# Patient Record
Sex: FEMALE | Race: WHITE | NOT HISPANIC OR LATINO | Employment: FULL TIME | URBAN - NONMETROPOLITAN AREA
[De-identification: names, ages, dates, MRNs, and addresses within clinical notes are randomized per-mention and may not be internally consistent; named-entity substitution may affect disease eponyms.]

---

## 2017-11-08 ENCOUNTER — HOSPITAL ENCOUNTER (EMERGENCY)
Facility: HOSPITAL | Age: 37
Discharge: HOME | End: 2017-11-08
Payer: COMMERCIAL

## 2017-11-08 VITALS
RESPIRATION RATE: 18 BRPM | TEMPERATURE: 97.5 F | WEIGHT: 183.13 LBS | SYSTOLIC BLOOD PRESSURE: 118 MMHG | HEART RATE: 74 BPM | OXYGEN SATURATION: 98 % | BODY MASS INDEX: 27.76 KG/M2 | HEIGHT: 68 IN | DIASTOLIC BLOOD PRESSURE: 58 MMHG

## 2017-11-08 DIAGNOSIS — M54.10 RADICULAR PAIN OF RIGHT LOWER EXTREMITY: ICD-10-CM

## 2017-11-08 DIAGNOSIS — M25.551 RIGHT HIP PAIN: Primary | ICD-10-CM

## 2017-11-08 PROCEDURE — 99283 EMERGENCY DEPT VISIT LOW MDM: CPT

## 2017-11-08 PROCEDURE — 96372 THER/PROPH/DIAG INJ SC/IM: CPT

## 2017-11-08 RX ORDER — METHOCARBAMOL 500 MG/1
1000 TABLET, FILM COATED ORAL 3 TIMES DAILY
Qty: 30 TABLET | Refills: 0 | Status: SHIPPED | OUTPATIENT
Start: 2017-11-08 | End: 2018-01-02

## 2017-11-08 RX ORDER — METHIMAZOLE 5 MG/1
5 TABLET ORAL DAILY
COMMUNITY

## 2017-11-08 RX ORDER — ALPRAZOLAM 0.5 MG/1
TABLET ORAL AS NEEDED
COMMUNITY

## 2017-11-08 RX ORDER — KETOROLAC TROMETHAMINE 30 MG/ML
30 INJECTION, SOLUTION INTRAMUSCULAR; INTRAVENOUS ONCE
Status: COMPLETED | OUTPATIENT
Start: 2017-11-08 | End: 2017-11-08

## 2017-11-08 RX ORDER — DICLOFENAC SODIUM 75 MG/1
75 TABLET, DELAYED RELEASE ORAL 2 TIMES DAILY
Qty: 20 TABLET | Refills: 0 | Status: SHIPPED | OUTPATIENT
Start: 2017-11-08 | End: 2018-01-02

## 2017-11-08 RX ADMIN — KETOROLAC TROMETHAMINE 30 MG: 30 INJECTION, SOLUTION INTRAMUSCULAR at 13:08

## 2017-11-08 NOTE — DISCHARGE INSTRUCTIONS
Hip Pain   WHAT YOU NEED TO KNOW:   Hip pain can be caused by a number of conditions, such as bursitis, arthritis, or muscle or tendon strain  X-rays do not show broken bones  You may have swelling in the fluid-filled sacs that protect your muscles and tendons  Hip pain can also be caused by a lower back problem  Hip pain may be caused by trauma, playing sports, or running  Your pain may start in your hip and go to your thigh, buttock, or groin  DISCHARGE INSTRUCTIONS:   Medicines:   · NSAIDs , such as ibuprofen, help decrease swelling, pain, and fever  This medicine is available with or without a doctor's order  NSAIDs can cause stomach bleeding or kidney problems in certain people  If you take blood thinner medicine, always ask your healthcare provider if NSAIDs are safe for you  Always read the medicine label and follow directions  · Take your medicine as directed  Contact your healthcare provider if you think your medicine is not helping or if you have side effects  Tell him of her if you are allergic to any medicine  Keep a list of the medicines, vitamins, and herbs you take  Include the amounts, and when and why you take them  Bring the list or the pill bottles to follow-up visits  Carry your medicine list with you in case of an emergency  Return to the emergency department if:   · Your pain gets worse  · You have numbness in your leg or toes  · You cannot put any weight on or move your hip  Contact your healthcare provider if:   · You have a fever  · Your pain does not decrease, even after treatment  · You have questions or concerns about your condition or care  Follow up with your healthcare provider as directed: You may need physical therapy, an injection, or more testing  You may need to see an orthopedic specialist  Write down your questions so you remember to ask them during your visits    Manage your hip pain:   · Rest  your injured hip so that it can heal  You may need to avoid putting any weight on your hip for at least 48 hours  Return to normal activities as directed  · Ice  the injury for 20 minutes every 4 hours, or as directed  Use an ice pack, or put crushed ice in a plastic bag  Cover it with a towel to protect your skin  Ice helps prevent tissue damage and decreases swelling and pain  · Elevate  your injured hip above the level of your heart as often as you can  This will help decrease swelling and pain  If possible, prop your hip and leg on pillows or blankets to keep the area elevated comfortably  · Maintain a healthy weight  Extra body weight can cause pressure and pain in your hip, knee, and ankle joints  Ask your healthcare provider how much you should weigh  Ask him to help you create a weight loss plan if you are overweight  · Use assistive devices as directed  You may need to use a cane or crutches  Assistive devices help decrease pain and pressure on your hip when you walk  Ask your healthcare provider for more information about assistive devices and how to use them correctly  © 2017 [address] [facility] Information is for End User's use only and may not be sold, redistributed or otherwise used for commercial purposes  All illustrations and images included in CareNotes® are the copyrighted property of [employer]  or Jhon Roberts  The above information is an  only  It is not intended as medical advice for individual conditions or treatments  Talk to your doctor, nurse or pharmacist before following any medical regimen to see if it is safe and effective for you

## 2017-11-15 NOTE — ED PROVIDER NOTES
History  Chief Complaint   Patient presents with    Hip Pain     right sided hip pain radiating to knee  History provided by:  Patient  Hip Pain   Location:  Right lateral hip  Quality:  Aching sore shooting  Severity:  Moderate  Onset quality:  Gradual  Duration:  4 weeks  Timing:  Constant  Progression:  Worsening  Chronicity:  Chronic (right hip pain x 2-3 years, worse for a few weeks )  Context:  Chronic right hip pain 2-3 yrs  seen [facility] orth[facility] 4-6 months ago for same, dx arthritis on xrays  no appointments to be seen this week was "told go to ER" if pain worsens  Relieved by:  Using arms to pull leg up, once knees to chest pain is better  just act of lifting the leg is painful  Worsened by: Walking up stairs  Ineffective treatments:  None tried- no maintenance or abortive meds  Associated symptoms: no abdominal pain, no chest pain, no congestion, no cough, no diarrhea, no ear pain, no fatigue, no fever, no headaches, no myalgias, no nausea, no rash, no rhinorrhea, no shortness of breath, no sore throat and no vomiting    Risk factors:  No recent or remote injury to the hip      Prior to Admission Medications   Prescriptions Last Dose Informant Patient Reported? Taking? ALPRAZolam (XANAX) 0 5 mg tablet   Yes Yes   Sig: Take by mouth as needed for anxiety   DULoxetine (CYMBALTA) 60 mg delayed release capsule   Yes Yes   Sig: Take 60 mg by mouth daily   calcium carbonate-vitamin D (OSCAL-D) 500 mg-200 units per tablet   Yes Yes   Sig: Take 1 tablet by mouth daily   methimazole (TAPAZOLE) 5 mg tablet   Yes Yes   Sig: Take 5 mg by mouth daily   simvastatin (ZOCOR) 20 mg tablet   Yes Yes   Sig: Take 20 mg by mouth daily at bedtime      Facility-Administered Medications: None       Past Medical History:   Diagnosis Date    Depression     Disease of thyroid gland        Past Surgical History:   Procedure Laterality Date    EYE SURGERY         History reviewed  No pertinent family history    I have reviewed and agree with the history as documented  Social History   Substance Use Topics    Smoking status: Current Every Day Smoker     Packs/day: 0 50     Types: Cigarettes    Smokeless tobacco: Never Used    Alcohol use No        Review of Systems   Constitutional: Negative for activity change, appetite change, chills, diaphoresis, fatigue, fever and unexpected weight change  HENT: Negative for congestion, ear pain, postnasal drip, rhinorrhea, sinus pressure and sore throat  Eyes: Negative for pain, discharge and redness  Respiratory: Negative for cough, chest tightness and shortness of breath  Cardiovascular: Negative for chest pain, palpitations and leg swelling  Gastrointestinal: Negative for abdominal pain, constipation, diarrhea, nausea and vomiting  Genitourinary: Negative for difficulty urinating, dysuria, flank pain, frequency, hematuria and urgency  Musculoskeletal: Positive for arthralgias  Negative for back pain, gait problem, joint swelling and myalgias  Skin: Negative for color change, rash and wound  Allergic/Immunologic: Negative for immunocompromised state  Neurological: Negative for dizziness, tremors, syncope, weakness, numbness and headaches  Physical Exam  ED Triage Vitals [11/08/17 1210]   Temperature Pulse Respirations Blood Pressure SpO2   97 5 °F (36 4 °C) 77 18 134/73 100 %      Temp Source Heart Rate Source Patient Position - Orthostatic VS BP Location FiO2 (%)   Temporal Monitor Lying Right arm --      Pain Score       Worst Possible Pain           Orthostatic Vital Signs  Vitals:    11/08/17 1210 11/08/17 1230 11/08/17 1300   BP: 134/73 128/80 118/58   Pulse: 77 78 74   Patient Position - Orthostatic VS: Lying Lying Lying       Physical Exam   Constitutional: She is oriented to person, place, and time  She appears well-developed and well-nourished  No distress  HENT:   Head: Normocephalic and atraumatic     Eyes: Conjunctivae are normal  Pupils are equal, round, and reactive to light  Cardiovascular: Normal rate, regular rhythm, normal heart sounds and intact distal pulses  No murmur heard  Pulmonary/Chest: Effort normal and breath sounds normal  No respiratory distress  She exhibits no tenderness  Musculoskeletal: Normal range of motion  She exhibits tenderness  She exhibits no edema or deformity  HF/HE KF/KE DF/PF intact  No saddle anesthesia  Sensation intact to light touch bilateral lower extremities  Patellar and achilles reflexes +2 symmetric  Skin pink and warm  DP pulses +2 by palp  Straight leg raise negative bilat  TTP lateral hip/greater trochanter RIGHT SIDE and pain elicited with hip flexion and external rotation vs resistance  Neurological: She is alert and oriented to person, place, and time  Skin: Skin is warm and dry  Capillary refill takes less than 2 seconds  She is not diaphoretic  Psychiatric: She has a normal mood and affect  Nursing note and vitals reviewed        ED Medications  Medications   ketorolac (TORADOL) 30 mg/mL injection 30 mg (30 mg Intramuscular Given 11/8/17 1308)       Diagnostic Studies  Results Reviewed     None                 No orders to display              Procedures  Procedures       Phone Contacts  ED Phone Contact    ED Course  ED Course      MDM  Number of Diagnoses or Management Options  Radicular pain of right lower extremity: new and does not require workup  Right hip pain: new and does not require workup  Patient Progress  Patient progress: stable    CritCare Time    Disposition  Final diagnoses:   Right hip pain   Radicular pain of right lower extremity     Time reflects when diagnosis was documented in both MDM as applicable and the Disposition within this note     Time User Action Codes Description Comment    11/8/2017 12:33 PM Rajwinder Hernandez Right hip pain     11/8/2017 12:33 PM Raulito Porter [M54 [id number]] Radicular pain of right lower extremity       ED Disposition     ED Disposition Condition Comment    Discharge  Mohsen Gutiérrez discharge to home/self care  Condition at discharge: Good        Follow-up Information     Follow up With Specialties Details Why [address]  Nephrology   [facility] [address] [place] [id number] [phone]      [facility]  Schedule an appointment as soon as possible for a visit orthopedic followup         Discharge Medication List as of 11/8/2017  1:03 PM      START taking these medications    Details   diclofenac (VOLTAREN) 75 mg EC tablet Take 1 tablet by mouth 2 (two) times a day for 10 days, Starting Wed 11/8/2017, Until Sat 11/18/2017, Print      methocarbamol (ROBAXIN) 500 mg tablet Take 2 tablets by mouth 3 (three) times a day for 5 days, Starting Wed 11/8/2017, Until Mon 11/13/2017, Print         CONTINUE these medications which have NOT CHANGED    Details   ALPRAZolam (XANAX) 0 5 mg tablet Take by mouth as needed for anxiety, Historical Med      calcium carbonate-vitamin D (OSCAL-D) 500 mg-200 units per tablet Take 1 tablet by mouth daily, Until Discontinued, Historical Med      DULoxetine (CYMBALTA) 60 mg delayed release capsule Take 60 mg by mouth daily, Until Discontinued, Historical Med      methimazole (TAPAZOLE) 5 mg tablet Take 5 mg by mouth daily, Historical Med      simvastatin (ZOCOR) 20 mg tablet Take 20 mg by mouth daily at bedtime, Until Discontinued, Historical Med           No discharge procedures on file      ED Provider  Electronically Signed by           Cathy Aguilar PA-C  11/15/17 1037

## 2018-01-02 ENCOUNTER — APPOINTMENT (EMERGENCY)
Dept: RADIOLOGY | Facility: HOSPITAL | Age: 38
End: 2018-01-02
Payer: COMMERCIAL

## 2018-01-02 ENCOUNTER — HOSPITAL ENCOUNTER (EMERGENCY)
Facility: HOSPITAL | Age: 38
Discharge: HOME | End: 2018-01-02
Admitting: EMERGENCY MEDICINE
Payer: COMMERCIAL

## 2018-01-02 VITALS
DIASTOLIC BLOOD PRESSURE: 70 MMHG | HEART RATE: 72 BPM | SYSTOLIC BLOOD PRESSURE: 126 MMHG | WEIGHT: 185 LBS | RESPIRATION RATE: 18 BRPM | OXYGEN SATURATION: 97 % | TEMPERATURE: 97.4 F | BODY MASS INDEX: 28.13 KG/M2

## 2018-01-02 DIAGNOSIS — R19.7 DIARRHEA: ICD-10-CM

## 2018-01-02 DIAGNOSIS — J06.9 VIRAL URI WITH COUGH: Primary | ICD-10-CM

## 2018-01-02 LAB
ANION GAP SERPL CALCULATED.3IONS-SCNC: 7 MMOL/L (ref 4–13)
BASOPHILS # BLD AUTO: 0.02 THOUSANDS/ΜL (ref 0–0.1)
BASOPHILS NFR BLD AUTO: 0 % (ref 0–1)
BUN SERPL-MCNC: 11 MG/DL (ref 5–25)
CALCIUM SERPL-MCNC: 8.9 MG/DL (ref 8.3–10.1)
CHLORIDE SERPL-SCNC: 102 MMOL/L (ref 100–108)
CO2 SERPL-SCNC: 30 MMOL/L (ref 21–32)
CREAT SERPL-MCNC: 0.65 MG/DL (ref 0.6–1.3)
EOSINOPHIL # BLD AUTO: 0.09 THOUSAND/ΜL (ref 0–0.61)
EOSINOPHIL NFR BLD AUTO: 1 % (ref 0–6)
ERYTHROCYTE [DISTWIDTH] IN BLOOD BY AUTOMATED COUNT: 12.7 % (ref 11.6–15.1)
GFR SERPL CREATININE-BSD FRML MDRD: 112 ML/MIN/1.73SQ M
GLUCOSE SERPL-MCNC: 90 MG/DL (ref 65–140)
HCT VFR BLD AUTO: 44.2 % (ref 34.8–46.1)
HGB BLD-MCNC: 15.2 G/DL (ref 11.5–15.4)
LYMPHOCYTES # BLD AUTO: 2.32 THOUSANDS/ΜL (ref 0.6–4.47)
LYMPHOCYTES NFR BLD AUTO: 26 % (ref 14–44)
MCH RBC QN AUTO: 31 PG (ref 26.8–34.3)
MCHC RBC AUTO-ENTMCNC: 34.4 G/DL (ref 31.4–37.4)
MCV RBC AUTO: 90 FL (ref 82–98)
MONOCYTES # BLD AUTO: 0.52 THOUSAND/ΜL (ref 0.17–1.22)
MONOCYTES NFR BLD AUTO: 6 % (ref 4–12)
NEUTROPHILS # BLD AUTO: 6.07 THOUSANDS/ΜL (ref 1.85–7.62)
NEUTS SEG NFR BLD AUTO: 67 % (ref 43–75)
PLATELET # BLD AUTO: 259 THOUSANDS/UL (ref 149–390)
PMV BLD AUTO: 8.9 FL (ref 8.9–12.7)
POTASSIUM SERPL-SCNC: 4 MMOL/L (ref 3.5–5.3)
RBC # BLD AUTO: 4.9 MILLION/UL (ref 3.81–5.12)
SODIUM SERPL-SCNC: 139 MMOL/L (ref 136–145)
WBC # BLD AUTO: 9.02 THOUSAND/UL (ref 4.31–10.16)

## 2018-01-02 PROCEDURE — 36415 COLL VENOUS BLD VENIPUNCTURE: CPT | Performed by: PHYSICIAN ASSISTANT

## 2018-01-02 PROCEDURE — 71046 X-RAY EXAM CHEST 2 VIEWS: CPT

## 2018-01-02 PROCEDURE — 85025 COMPLETE CBC W/AUTO DIFF WBC: CPT | Performed by: PHYSICIAN ASSISTANT

## 2018-01-02 PROCEDURE — 96360 HYDRATION IV INFUSION INIT: CPT

## 2018-01-02 PROCEDURE — 80048 BASIC METABOLIC PNL TOTAL CA: CPT | Performed by: PHYSICIAN ASSISTANT

## 2018-01-02 PROCEDURE — 99284 EMERGENCY DEPT VISIT MOD MDM: CPT

## 2018-01-02 RX ORDER — DICYCLOMINE HCL 20 MG
20 TABLET ORAL EVERY 6 HOURS PRN
Qty: 10 TABLET | Refills: 0 | Status: SHIPPED | OUTPATIENT
Start: 2018-01-02

## 2018-01-02 RX ORDER — FLUTICASONE PROPIONATE 50 MCG
2 SPRAY, SUSPENSION (ML) NASAL DAILY
Qty: 16 G | Refills: 0 | Status: SHIPPED | OUTPATIENT
Start: 2018-01-02

## 2018-01-02 RX ADMIN — SODIUM CHLORIDE 1000 ML: 0.9 INJECTION, SOLUTION INTRAVENOUS at 11:48

## 2018-01-02 NOTE — DISCHARGE INSTRUCTIONS
Viral Syndrome   WHAT YOU NEED TO KNOW:   Viral syndrome is a term used for a viral infection that has no clear cause  Viruses are spread easily from person to person through the air and on shared items  DISCHARGE INSTRUCTIONS:   Call 911 for the following:   · You have a seizure  · You cannot be woken  · You have chest pain or trouble breathing  Return to the emergency department if:   · You have a stiff neck, a bad headache, and sensitivity to light  · You feel weak, dizzy, or confused  · You stop urinating or urinate a lot less than normal      · You cough up blood or thick, yellow or green, mucus  · You have severe abdominal pain or your abdomen is larger than usual   Contact your healthcare provider if:   · Your symptoms do not get better with treatment, or get worse, after 3 days  · You have a rash or ear pain  · You have burning when you urinate  · You have questions or concerns about your condition or care  Medicines: You may  need any of the following:  · Acetaminophen  decreases pain and fever  It is available without a doctor's order  Ask how much medicine to take and how often to take it  Follow directions  Acetaminophen can cause liver damage if not taken correctly  · NSAIDs , such as ibuprofen, help decrease swelling, pain, and fever  NSAIDs can cause stomach bleeding or kidney problems in certain people  If you take blood thinner medicine, always ask your healthcare provider if NSAIDs are safe for you  Always read the medicine label and follow directions  · Cold medicine  helps decrease swelling, control a cough, and relieve chest or nasal congestion  · Saline nasal spray  helps decrease nasal congestion  · Take your medicine as directed  Contact your healthcare provider if you think your medicine is not helping or if you have side effects  Tell him of her if you are allergic to any medicine   Keep a list of the medicines, vitamins, and herbs you take  Include the amounts, and when and why you take them  Bring the list or the pill bottles to follow-up visits  Carry your medicine list with you in case of an emergency  Manage your symptoms:   · Drink liquids as directed  to prevent dehydration  Ask how much liquid to drink each day and which liquids are best for you  Ask if you should drink an oral rehydration solution (ORS)  An ORS has the right amounts of water, salts, and sugar you need to replace body fluids  This may help prevent dehydration caused by vomiting or diarrhea  Do not drink liquids with caffeine  Drinks with caffeine can make dehydration worse  · Get plenty of rest  to help your body heal  Take naps throughout the day  Ask your healthcare provider when you can return to work and your normal activities  · Use a cool mist humidifier  to help you breathe easier if you have nasal or chest congestion  Ask your healthcare provider how to use a cool mist humidifier  · Eat honey or use cough drops  to help decrease throat discomfort  Ask your healthcare provider how much honey you should eat each day  Cough drops are available without a doctor's order  Follow directions for taking cough drops  · Do not smoke and stay away from others who smoke  Nicotine and other chemicals in cigarettes and cigars can cause lung damage  Smoking can also delay healing  Ask your healthcare provider for information if you currently smoke and need help to quit  E-cigarettes or smokeless tobacco still contain nicotine  Talk to your healthcare provider before you use these products  · Wash your hands frequently  to prevent the spread of germs to others  Use soap and water  Use gel hand  when soap and water are not available  Wash your hands after you use the bathroom, cough, or sneeze  Wash your hands before you prepare or eat food    Follow up with your healthcare provider as directed:  Write down your questions so you remember to ask them during your visits  © 2017 [address] [employer] Information is for End User's use only and may not be sold, redistributed or otherwise used for commercial purposes  All illustrations and images included in CareNotes® are the copyrighted property of [employer]  or Jhon Roberts  The above information is an  only  It is not intended as medical advice for individual conditions or treatments  Talk to your doctor, nurse or pharmacist before following any medical regimen to see if it is safe and effective for you  Acute Diarrhea   WHAT YOU NEED TO KNOW:   Acute diarrhea starts quickly and lasts a short time, usually 1 to 3 days  It can last up to 2 weeks  You may not be able to control your diarrhea  Acute diarrhea usually stops on its own  DISCHARGE INSTRUCTIONS:   Return to the emergency department if:   · You feel confused  · Your heartbeat is faster than normal      · Your eyes look deeply sunken, or you have no tears when you cry  · You urinate less than usual, or your urine is dark yellow  · You have blood or mucus in your stools  · You have severe abdominal pain  · You are unable to drink any liquids  Contact your healthcare provider if:   · Your symptoms do not get better with treatment  · You have a fever higher than 101 3°F (38 5°C)  · You have trouble eating and drinking because you are vomiting  · You are thirsty or have a dry mouth  · Your diarrhea does not get better in 7 days  · You have questions or concerns about your condition or care  Follow up with your healthcare provider as directed:  Write down your questions so you remember to ask them during your visits  Medicines:  · Diarrhea medicine  is an over-the-counter medicine that helps slow or stop your diarrhea  If you take other medicines, talk to your healthcare provider before you take diarrhea medicine       · Antibiotics  may be given to help treat an infection caused by bacteria  · Antiparasitics  may be given to treat an infection caused by parasites  · Take your medicine as directed  Contact your healthcare provider if you think your medicine is not helping or if you have side effects  Tell him of her if you are allergic to any medicine  Keep a list of the medicines, vitamins, and herbs you take  Include the amounts, and when and why you take them  Bring the list or the pill bottles to follow-up visits  Carry your medicine list with you in case of an emergency  Self-care:   · Drink liquids as directed  Liquids will help prevent dehydration caused by diarrhea  Ask your healthcare provider how much liquid to drink each day and which liquids are best for you  You may need to drink an oral rehydration solution (ORS)  An ORS has the right amounts of water, salts, and sugar you need to replace body fluids  You can buy an ORS at most grocery stores and pharmacies  · Eat foods that are easy to digest   Examples include rice, lentils, cereal, bananas, potatoes, and bread  It also includes some fruits (bananas, melon), well-cooked vegetables, and lean meats  Avoid foods high in fiber, fat, and sugar  Also avoid caffeine, alcohol, dairy, and red meat until your diarrhea is gone  Prevent acute diarrhea:   · Wash your hands often  Use soap and water  Wash your hands before you eat or prepare food  Also wash your hands after you use the bathroom  Use an alcohol-based hand gel when soap and water are not available  · Keep bathroom surfaces clean  This helps prevent the spread of germs that cause acute diarrhea  · Wash fruits and vegetables well before you eat them  This can help remove germs that cause diarrhea  If possible, remove the skin from fruits and vegetables, or cook them well before you eat them  · Cook meat as directed        ¨ Cook ground meat  to 160°F      ¨ Cook ground poultry, whole poultry, or cuts of poultry  to at least 165°F  Remove the meat from heat  Let it stand for 3 minutes before you eat it  ¨ Cook whole cuts of meat other than poultry  to at least 145°F  Remove the meat from heat  Let it stand for 3 minutes before you eat it  · Wash dishes that have touched raw meat with hot water and soap  This includes cutting boards, utensils, dishes, and serving containers  · Place raw or cooked meat in the refrigerator as soon as possible  Bacteria can grow in meat that is left at room temperature too long  · Do not eat raw or undercooked oysters, clams, or mussels  These foods may be contaminated and cause infection  · Drink filtered or treated water only when you travel  Do not put ice in your drinks  Drink bottled water whenever possible  © 2017 [address] [employer] Information is for End User's use only and may not be sold, redistributed or otherwise used for commercial purposes  All illustrations and images included in CareNotes® are the copyrighted property of A [employer] M [employer]  or Jhon Roberts  The above information is an  only  It is not intended as medical advice for individual conditions or treatments  Talk to your doctor, nurse or pharmacist before following any medical regimen to see if it is safe and effective for you

## 2018-01-05 NOTE — ED PROVIDER NOTES
History  Chief Complaint   Patient presents with    Diarrhea     Patient has had a cold and diarrhea for the past 7 days  26-year-old healthy female with upper respiratory symptoms x1 week now with diarrhea x1 day  With cough productive of nondescript sputum, worse at nighttime, no wheezing or shortness of breath or chest pain  Also reports postnasal drip, sore throat in the mornings, and with cough  Nasal congestion primarily a stuffy nose  No fevers no chills no vomiting no nausea no belly pain no extremity edema or pain  No headaches  Otherwise healthy  Saw PCP last week for follow-up on her thyroid studies, she has hyperthyroidism, she is on Tapazole for same, there is a plan to change to decrease her dose, but this has not taken affect yet  History provided by:  Patient  Diarrhea   Associated symptoms: no abdominal pain, no arthralgias, no chills, no diaphoresis, no fever, no headaches, no myalgias and no vomiting        Prior to Admission Medications   Prescriptions Last Dose Informant Patient Reported? Taking? ALPRAZolam (XANAX) 0 5 mg tablet   Yes No   Sig: Take by mouth as needed for anxiety   DULoxetine (CYMBALTA) 60 mg delayed release capsule   Yes No   Sig: Take 60 mg by mouth daily   calcium carbonate-vitamin D (OSCAL-D) 500 mg-200 units per tablet   Yes No   Sig: Take 1 tablet by mouth daily   methimazole (TAPAZOLE) 5 mg tablet   Yes No   Sig: Take 5 mg by mouth daily   simvastatin (ZOCOR) 20 mg tablet   Yes No   Sig: Take 20 mg by mouth daily at bedtime      Facility-Administered Medications: None       Past Medical History:   Diagnosis Date    Depression     Disease of thyroid gland        Past Surgical History:   Procedure Laterality Date    EYE SURGERY         History reviewed  No pertinent family history  I have reviewed and agree with the history as documented      Social History   Substance Use Topics    Smoking status: Current Every Day Smoker     Packs/day: 0 50 Types: Cigarettes    Smokeless tobacco: Never Used    Alcohol use No        Review of Systems   Constitutional: Negative for activity change, appetite change, chills, diaphoresis, fatigue, fever and unexpected weight change  HENT: Positive for congestion, postnasal drip and sinus pressure  Negative for ear pain, rhinorrhea, sore throat, tinnitus and trouble swallowing  Eyes: Negative for pain, discharge and redness  Respiratory: Positive for cough  Negative for chest tightness and shortness of breath  Cardiovascular: Negative for chest pain, palpitations and leg swelling  Gastrointestinal: Negative for abdominal pain, constipation, diarrhea, nausea and vomiting  Genitourinary: Negative for difficulty urinating, dysuria, flank pain, frequency, hematuria and urgency  Musculoskeletal: Negative for arthralgias, back pain and myalgias  Skin: Negative for color change, rash and wound  Allergic/Immunologic: Negative for immunocompromised state  Neurological: Negative for dizziness, tremors, syncope, weakness, numbness and headaches  Physical Exam  ED Triage Vitals [01/02/18 1104]   Temperature Pulse Respirations Blood Pressure SpO2   (!) 97 4 °F (36 3 °C) 68 18 134/79 95 %      Temp Source Heart Rate Source Patient Position - Orthostatic VS BP Location FiO2 (%)   Temporal Monitor Lying Right arm --      Pain Score       No Pain           Orthostatic Vital Signs  Vitals:    01/02/18 1104 01/02/18 1253   BP: 134/79 126/70   Pulse: 68 72   Patient Position - Orthostatic VS: Lying        Physical Exam   Constitutional: She is oriented to person, place, and time  Vital signs are normal  She appears well-developed and well-nourished  Non-toxic appearance  No distress  HENT:   Head: Normocephalic and atraumatic  Right Ear: Hearing and external ear normal  A middle ear effusion is present  Left Ear: Hearing and external ear normal  A middle ear effusion is present     Nose: Mucosal edema present  No rhinorrhea  Mouth/Throat: Uvula is midline, oropharynx is clear and moist and mucous membranes are normal  No oral lesions  No trismus in the jaw  No uvula swelling  No oropharyngeal exudate, posterior oropharyngeal edema, posterior oropharyngeal erythema or tonsillar abscesses  Tonsils are 1+ on the right  Tonsils are 1+ on the left  No tonsillar exudate  Eyes: Conjunctivae, EOM and lids are normal  Pupils are equal, round, and reactive to light  Right eye exhibits no discharge  Left eye exhibits no discharge  Neck: Normal range of motion and full passive range of motion without pain  Neck supple  No JVD present  No thyromegaly present  Cardiovascular: Normal rate, regular rhythm, normal heart sounds and intact distal pulses  No murmur heard  Pulmonary/Chest: Effort normal  No accessory muscle usage  No tachypnea  No respiratory distress  She has decreased breath sounds  She has no wheezes  She has no rhonchi  She has no rales  She exhibits no tenderness  Abdominal: Soft  Normal appearance and bowel sounds are normal  She exhibits no distension  There is no hepatosplenomegaly  There is no tenderness  There is no rebound and no CVA tenderness  No hernia  Musculoskeletal: Normal range of motion  She exhibits no edema or tenderness  Lymphadenopathy:     She has no cervical adenopathy  Neurological: She is alert and oriented to person, place, and time  No cranial nerve deficit or sensory deficit  Skin: Skin is warm, dry and intact  Capillary refill takes less than 2 seconds  No rash noted  She is not diaphoretic  No erythema  No pallor  Psychiatric: She has a normal mood and affect  Her speech is normal and behavior is normal    Nursing note and vitals reviewed        ED Medications  Medications   sodium chloride 0 9 % bolus 1,000 mL (0 mL Intravenous Stopped 1/2/18 1253)       Diagnostic Studies  Results Reviewed     Procedure Component Value Units Date/Time    Basic metabolic panel [[id number]] Collected:  01/02/18 1148    Lab Status:  Final result Specimen:  Blood from Arm, Right Updated:  01/02/18 1202     Sodium 139 mmol/L      Potassium 4 0 mmol/L      Chloride 102 mmol/L      CO2 30 mmol/L      Anion Gap 7 mmol/L      BUN 11 mg/dL      Creatinine 0 65 mg/dL      Glucose 90 mg/dL      Calcium 8 9 mg/dL      eGFR 112 ml/min/1 73sq m     Narrative:         National Kidney Disease Education Program recommendations are as follows:  GFR calculation is accurate only with a steady state creatinine  Chronic Kidney disease less than 60 ml/min/1 73 sq  meters  Kidney failure less than 15 ml/min/1 73 sq  meters  CBC and differential [[id number]]  (Normal) Collected:  01/02/18 1148    Lab Status:  Final result Specimen:  Blood from Arm, Right Updated:  01/02/18 1155     WBC 9 02 Thousand/uL      RBC 4 90 Million/uL      Hemoglobin 15 2 g/dL      Hematocrit 44 2 %      MCV 90 fL      MCH 31 0 pg      MCHC 34 4 g/dL      RDW 12 7 %      MPV 8 9 fL      Platelets 449 Thousands/uL      Neutrophils Relative 67 %      Lymphocytes Relative 26 %      Monocytes Relative 6 %      Eosinophils Relative 1 %      Basophils Relative 0 %      Neutrophils Absolute 6 07 Thousands/µL      Lymphocytes Absolute 2 32 Thousands/µL      Monocytes Absolute 0 52 Thousand/µL      Eosinophils Absolute 0 09 Thousand/µL      Basophils Absolute 0 02 Thousands/µL                  XR chest 2 views   ED Interpretation by Meliton Simental PA-C (01/02 1240)   No infiltrate or ptx normal cardiomediastinal silhouette      Final Result by SLY Merida MD (01/02 1241)      No active pulmonary disease                      Workstation performed: [id number]                    Procedures  Procedures       Phone Contacts  ED Phone Contact    ED Course  ED Course as of Jan 04 1945   Tue Jan 02, 2018   1202 WBC: 9 02   1202 Hemoglobin: 15 2   1202 Hematocrit: 44 2   1202 Platelets: 314   3919 Sodium: 139   1202 Potassium: 4 0 1202 Chloride: 102   1202 CO2: 30   1202 BUN: 11   1202 Creatinine: 0 65   1202 Glucose: 90   1202 eGFR: 112       [facility]  Number of Diagnoses or Management Options  Diarrhea: new and does not require workup  Viral URI with cough: new and does not require workup     Amount and/or Complexity of Data Reviewed  Tests in the radiology section of CPT®: ordered and reviewed  Independent visualization of images, tracings, or specimens: yes    Patient Progress  Patient progress: stable    CritCare Time    Disposition  Final diagnoses:   Viral URI with cough   Diarrhea     Time reflects when diagnosis was documented in both MDM as applicable and the Disposition within this note     Time User Action Codes Description Comment    1/2/2018 12:30 PM Leti Harringtong Add [J06 9,  B97 89] Viral URI with cough     1/2/2018 12:30 PM Leti Harringtong Add [R19 7] Diarrhea       ED Disposition     ED Disposition Condition Comment    Discharge  Lilian Malik discharge to home/self care      Condition at discharge: Good        Follow-up Information     Follow up With Specialties Details Why [address], [address] Nephrology  [facility] [address]  [phone]          Discharge Medication List as of 1/2/2018 12:33 PM      START taking these medications    Details   dicyclomine (BENTYL) 20 mg tablet Take 1 tablet by mouth every 6 (six) hours as needed (diarrhea), Starting Tue 1/2/2018, Print      fluticasone (FLONASE) 50 mcg/act nasal spray 2 sprays into each nostril daily, Starting Tue 1/2/2018, Print         CONTINUE these medications which have NOT CHANGED    Details   ALPRAZolam (XANAX) 0 5 mg tablet Take by mouth as needed for anxiety, Historical Med      calcium carbonate-vitamin D (OSCAL-D) 500 mg-200 units per tablet Take 1 tablet by mouth daily, Until Discontinued, Historical Med      DULoxetine (CYMBALTA) 60 mg delayed release capsule Take 60 mg by mouth daily, Until Discontinued, Historical Med      methimazole (TAPAZOLE) 5 mg tablet Take 5 mg by mouth daily, Historical Med      simvastatin (ZOCOR) 20 mg tablet Take 20 mg by mouth daily at bedtime, Until Discontinued, Historical Med           No discharge procedures on file      ED Provider  Electronically Signed by           Kevyn Davis PA-C  01/04/18 1945

## 2018-08-15 ENCOUNTER — TRANSCRIBE ORDERS (OUTPATIENT)
Dept: LAB | Facility: MEDICAL CENTER | Age: 38
End: 2018-08-15

## 2018-08-15 ENCOUNTER — APPOINTMENT (OUTPATIENT)
Dept: LAB | Facility: MEDICAL CENTER | Age: 38
End: 2018-08-15
Payer: COMMERCIAL

## 2018-08-15 DIAGNOSIS — E55.9 AVITAMINOSIS D: ICD-10-CM

## 2018-08-15 DIAGNOSIS — R53.83 OTHER FATIGUE: Primary | ICD-10-CM

## 2018-08-15 DIAGNOSIS — R19.7 DIARRHEA, UNSPECIFIED TYPE: ICD-10-CM

## 2018-08-15 DIAGNOSIS — D51.9 ANEMIA DUE TO VITAMIN B12 DEFICIENCY, UNSPECIFIED B12 DEFICIENCY TYPE: ICD-10-CM

## 2018-08-15 DIAGNOSIS — K21.00 GASTRO-ESOPHAGEAL REFLUX DISEASE WITH ESOPHAGITIS: ICD-10-CM

## 2018-08-15 DIAGNOSIS — E07.9 DISEASE OF THYROID GLAND: ICD-10-CM

## 2018-08-15 DIAGNOSIS — R53.83 OTHER FATIGUE: ICD-10-CM

## 2018-08-15 DIAGNOSIS — K90.9 INTESTINAL MALABSORPTION, UNSPECIFIED TYPE: ICD-10-CM

## 2018-08-15 LAB
25(OH)D3 SERPL-MCNC: 31 NG/ML (ref 30–100)
ALBUMIN SERPL BCP-MCNC: 3.8 G/DL (ref 3.5–5)
ALP SERPL-CCNC: 70 U/L (ref 46–116)
ALT SERPL W P-5'-P-CCNC: 23 U/L (ref 12–78)
ANION GAP SERPL CALCULATED.3IONS-SCNC: 6 MMOL/L (ref 4–13)
AST SERPL W P-5'-P-CCNC: 9 U/L (ref 5–45)
BASOPHILS # BLD AUTO: 0.04 THOUSANDS/ΜL (ref 0–0.1)
BASOPHILS NFR BLD AUTO: 0 % (ref 0–1)
BILIRUB DIRECT SERPL-MCNC: 0.11 MG/DL (ref 0–0.2)
BILIRUB SERPL-MCNC: 0.59 MG/DL (ref 0.2–1)
BUN SERPL-MCNC: 12 MG/DL (ref 5–25)
CALCIUM SERPL-MCNC: 9.2 MG/DL (ref 8.3–10.1)
CHLORIDE SERPL-SCNC: 103 MMOL/L (ref 100–108)
CHOLEST SERPL-MCNC: 235 MG/DL (ref 50–200)
CO2 SERPL-SCNC: 26 MMOL/L (ref 21–32)
CREAT SERPL-MCNC: 0.8 MG/DL (ref 0.6–1.3)
EOSINOPHIL # BLD AUTO: 0.19 THOUSAND/ΜL (ref 0–0.61)
EOSINOPHIL NFR BLD AUTO: 2 % (ref 0–6)
ERYTHROCYTE [DISTWIDTH] IN BLOOD BY AUTOMATED COUNT: 12 % (ref 11.6–15.1)
GFR SERPL CREATININE-BSD FRML MDRD: 93 ML/MIN/1.73SQ M
GLUCOSE P FAST SERPL-MCNC: 107 MG/DL (ref 65–99)
HCT VFR BLD AUTO: 45.6 % (ref 34.8–46.1)
HDLC SERPL-MCNC: 35 MG/DL (ref 40–60)
HGB BLD-MCNC: 15.1 G/DL (ref 11.5–15.4)
IMM GRANULOCYTES # BLD AUTO: 0.03 THOUSAND/UL (ref 0–0.2)
IMM GRANULOCYTES NFR BLD AUTO: 0 % (ref 0–2)
LDLC SERPL CALC-MCNC: 166 MG/DL (ref 0–100)
LYMPHOCYTES # BLD AUTO: 2.14 THOUSANDS/ΜL (ref 0.6–4.47)
LYMPHOCYTES NFR BLD AUTO: 23 % (ref 14–44)
MCH RBC QN AUTO: 32.9 PG (ref 26.8–34.3)
MCHC RBC AUTO-ENTMCNC: 33.1 G/DL (ref 31.4–37.4)
MCV RBC AUTO: 99 FL (ref 82–98)
MONOCYTES # BLD AUTO: 0.56 THOUSAND/ΜL (ref 0.17–1.22)
MONOCYTES NFR BLD AUTO: 6 % (ref 4–12)
NEUTROPHILS # BLD AUTO: 6.29 THOUSANDS/ΜL (ref 1.85–7.62)
NEUTS SEG NFR BLD AUTO: 69 % (ref 43–75)
NONHDLC SERPL-MCNC: 200 MG/DL
NRBC BLD AUTO-RTO: 0 /100 WBCS
PLATELET # BLD AUTO: 274 THOUSANDS/UL (ref 149–390)
PMV BLD AUTO: 10.1 FL (ref 8.9–12.7)
POTASSIUM SERPL-SCNC: 4.1 MMOL/L (ref 3.5–5.3)
PROT SERPL-MCNC: 7.8 G/DL (ref 6.4–8.2)
RBC # BLD AUTO: 4.59 MILLION/UL (ref 3.81–5.12)
SODIUM SERPL-SCNC: 135 MMOL/L (ref 136–145)
T4 FREE SERPL-MCNC: 1.07 NG/DL (ref 0.76–1.46)
TRIGL SERPL-MCNC: 170 MG/DL
TSH SERPL DL<=0.05 MIU/L-ACNC: 0.97 UIU/ML (ref 0.36–3.74)
VIT B12 SERPL-MCNC: 372 PG/ML (ref 100–900)
WBC # BLD AUTO: 9.25 THOUSAND/UL (ref 4.31–10.16)

## 2018-08-15 PROCEDURE — 80048 BASIC METABOLIC PNL TOTAL CA: CPT

## 2018-08-15 PROCEDURE — 86255 FLUORESCENT ANTIBODY SCREEN: CPT

## 2018-08-15 PROCEDURE — 84439 ASSAY OF FREE THYROXINE: CPT

## 2018-08-15 PROCEDURE — 80076 HEPATIC FUNCTION PANEL: CPT

## 2018-08-15 PROCEDURE — 85025 COMPLETE CBC W/AUTO DIFF WBC: CPT

## 2018-08-15 PROCEDURE — 84443 ASSAY THYROID STIM HORMONE: CPT

## 2018-08-15 PROCEDURE — 36415 COLL VENOUS BLD VENIPUNCTURE: CPT

## 2018-08-15 PROCEDURE — 82784 ASSAY IGA/IGD/IGG/IGM EACH: CPT

## 2018-08-15 PROCEDURE — 83516 IMMUNOASSAY NONANTIBODY: CPT

## 2018-08-15 PROCEDURE — 82306 VITAMIN D 25 HYDROXY: CPT

## 2018-08-15 PROCEDURE — 82607 VITAMIN B-12: CPT

## 2018-08-15 PROCEDURE — 80061 LIPID PANEL: CPT

## 2018-08-16 LAB
ENDOMYSIUM IGA SER QL: NEGATIVE
GLIADIN PEPTIDE IGA SER-ACNC: 61 UNITS (ref 0–19)
GLIADIN PEPTIDE IGG SER-ACNC: 2 UNITS (ref 0–19)
IGA SERPL-MCNC: 180 MG/DL (ref 87–352)
TTG IGA SER-ACNC: <2 U/ML (ref 0–3)
TTG IGG SER-ACNC: <2 U/ML (ref 0–5)

## 2018-11-05 ENCOUNTER — TRANSCRIBE ORDERS (OUTPATIENT)
Dept: LAB | Facility: MEDICAL CENTER | Age: 38
End: 2018-11-05

## 2018-11-05 ENCOUNTER — APPOINTMENT (OUTPATIENT)
Dept: LAB | Facility: MEDICAL CENTER | Age: 38
End: 2018-11-05
Payer: COMMERCIAL

## 2018-11-05 DIAGNOSIS — Z00.8 HEALTH EXAMINATION IN POPULATION SURVEY: Primary | ICD-10-CM

## 2018-11-05 DIAGNOSIS — Z00.8 HEALTH EXAMINATION IN POPULATION SURVEY: ICD-10-CM

## 2018-11-05 LAB
ALBUMIN SERPL BCP-MCNC: 3.6 G/DL (ref 3.5–5)
ALP SERPL-CCNC: 72 U/L (ref 46–116)
ALT SERPL W P-5'-P-CCNC: 27 U/L (ref 12–78)
ANION GAP SERPL CALCULATED.3IONS-SCNC: 4 MMOL/L (ref 4–13)
AST SERPL W P-5'-P-CCNC: 13 U/L (ref 5–45)
BASOPHILS # BLD AUTO: 0.04 THOUSANDS/ΜL (ref 0–0.1)
BASOPHILS NFR BLD AUTO: 1 % (ref 0–1)
BILIRUB SERPL-MCNC: 0.3 MG/DL (ref 0.2–1)
BUN SERPL-MCNC: 13 MG/DL (ref 5–25)
CALCIUM SERPL-MCNC: 8.8 MG/DL (ref 8.3–10.1)
CHLORIDE SERPL-SCNC: 104 MMOL/L (ref 100–108)
CHOLEST SERPL-MCNC: 156 MG/DL (ref 50–200)
CO2 SERPL-SCNC: 28 MMOL/L (ref 21–32)
CREAT SERPL-MCNC: 0.71 MG/DL (ref 0.6–1.3)
EOSINOPHIL # BLD AUTO: 0.25 THOUSAND/ΜL (ref 0–0.61)
EOSINOPHIL NFR BLD AUTO: 3 % (ref 0–6)
ERYTHROCYTE [DISTWIDTH] IN BLOOD BY AUTOMATED COUNT: 11.9 % (ref 11.6–15.1)
FERRITIN SERPL-MCNC: 32 NG/ML (ref 8–388)
GFR SERPL CREATININE-BSD FRML MDRD: 107 ML/MIN/1.73SQ M
GLUCOSE P FAST SERPL-MCNC: 81 MG/DL (ref 65–99)
HCT VFR BLD AUTO: 41.6 % (ref 34.8–46.1)
HDLC SERPL-MCNC: 33 MG/DL (ref 40–60)
HGB BLD-MCNC: 13.9 G/DL (ref 11.5–15.4)
IMM GRANULOCYTES # BLD AUTO: 0.01 THOUSAND/UL (ref 0–0.2)
IMM GRANULOCYTES NFR BLD AUTO: 0 % (ref 0–2)
IRON SATN MFR SERPL: 24 %
IRON SERPL-MCNC: 79 UG/DL (ref 50–170)
LDLC SERPL CALC-MCNC: 97 MG/DL (ref 0–100)
LYMPHOCYTES # BLD AUTO: 2.36 THOUSANDS/ΜL (ref 0.6–4.47)
LYMPHOCYTES NFR BLD AUTO: 30 % (ref 14–44)
MCH RBC QN AUTO: 32.5 PG (ref 26.8–34.3)
MCHC RBC AUTO-ENTMCNC: 33.4 G/DL (ref 31.4–37.4)
MCV RBC AUTO: 97 FL (ref 82–98)
MONOCYTES # BLD AUTO: 0.55 THOUSAND/ΜL (ref 0.17–1.22)
MONOCYTES NFR BLD AUTO: 7 % (ref 4–12)
NEUTROPHILS # BLD AUTO: 4.55 THOUSANDS/ΜL (ref 1.85–7.62)
NEUTS SEG NFR BLD AUTO: 59 % (ref 43–75)
NONHDLC SERPL-MCNC: 123 MG/DL
NRBC BLD AUTO-RTO: 0 /100 WBCS
PLATELET # BLD AUTO: 270 THOUSANDS/UL (ref 149–390)
PMV BLD AUTO: 10.2 FL (ref 8.9–12.7)
POTASSIUM SERPL-SCNC: 4.1 MMOL/L (ref 3.5–5.3)
PROT SERPL-MCNC: 7.3 G/DL (ref 6.4–8.2)
RBC # BLD AUTO: 4.28 MILLION/UL (ref 3.81–5.12)
SODIUM SERPL-SCNC: 136 MMOL/L (ref 136–145)
T4 FREE SERPL-MCNC: 1.2 NG/DL (ref 0.76–1.46)
TIBC SERPL-MCNC: 330 UG/DL (ref 250–450)
TRIGL SERPL-MCNC: 132 MG/DL
TSH SERPL DL<=0.05 MIU/L-ACNC: <0.007 UIU/ML (ref 0.36–3.74)
WBC # BLD AUTO: 7.76 THOUSAND/UL (ref 4.31–10.16)

## 2018-11-05 PROCEDURE — 80053 COMPREHEN METABOLIC PANEL: CPT

## 2018-11-05 PROCEDURE — 82728 ASSAY OF FERRITIN: CPT

## 2018-11-05 PROCEDURE — 80061 LIPID PANEL: CPT

## 2018-11-05 PROCEDURE — 85025 COMPLETE CBC W/AUTO DIFF WBC: CPT

## 2018-11-05 PROCEDURE — 83550 IRON BINDING TEST: CPT

## 2018-11-05 PROCEDURE — 84439 ASSAY OF FREE THYROXINE: CPT

## 2018-11-05 PROCEDURE — 83540 ASSAY OF IRON: CPT

## 2018-11-05 PROCEDURE — 36415 COLL VENOUS BLD VENIPUNCTURE: CPT

## 2018-11-05 PROCEDURE — 84443 ASSAY THYROID STIM HORMONE: CPT

## 2020-09-01 ENCOUNTER — APPOINTMENT (OUTPATIENT)
Dept: LAB | Facility: MEDICAL CENTER | Age: 40
End: 2020-09-01
Payer: COMMERCIAL

## 2020-09-01 ENCOUNTER — TRANSCRIBE ORDERS (OUTPATIENT)
Dept: LAB | Facility: MEDICAL CENTER | Age: 40
End: 2020-09-01

## 2020-09-01 DIAGNOSIS — E78.5 HYPERLIPIDEMIA, UNSPECIFIED HYPERLIPIDEMIA TYPE: Primary | ICD-10-CM

## 2020-09-01 DIAGNOSIS — E05.90 HYPERTHYROIDISM: ICD-10-CM

## 2020-09-01 DIAGNOSIS — F41.9 ANXIETY: ICD-10-CM

## 2020-09-01 DIAGNOSIS — E78.5 HYPERLIPIDEMIA, UNSPECIFIED HYPERLIPIDEMIA TYPE: ICD-10-CM

## 2020-09-01 LAB
ALBUMIN SERPL BCP-MCNC: 3.7 G/DL (ref 3.5–5)
ALP SERPL-CCNC: 69 U/L (ref 46–116)
ALT SERPL W P-5'-P-CCNC: 20 U/L (ref 12–78)
ANION GAP SERPL CALCULATED.3IONS-SCNC: 5 MMOL/L (ref 4–13)
AST SERPL W P-5'-P-CCNC: 13 U/L (ref 5–45)
BASOPHILS # BLD AUTO: 0.07 THOUSANDS/ΜL (ref 0–0.1)
BASOPHILS NFR BLD AUTO: 1 % (ref 0–1)
BILIRUB SERPL-MCNC: 0.47 MG/DL (ref 0.2–1)
BUN SERPL-MCNC: 10 MG/DL (ref 5–25)
CALCIUM SERPL-MCNC: 8.9 MG/DL (ref 8.3–10.1)
CHLORIDE SERPL-SCNC: 105 MMOL/L (ref 100–108)
CHOLEST SERPL-MCNC: 181 MG/DL (ref 50–200)
CO2 SERPL-SCNC: 29 MMOL/L (ref 21–32)
CREAT SERPL-MCNC: 0.68 MG/DL (ref 0.6–1.3)
EOSINOPHIL # BLD AUTO: 0.44 THOUSAND/ΜL (ref 0–0.61)
EOSINOPHIL NFR BLD AUTO: 5 % (ref 0–6)
ERYTHROCYTE [DISTWIDTH] IN BLOOD BY AUTOMATED COUNT: 12.8 % (ref 11.6–15.1)
GFR SERPL CREATININE-BSD FRML MDRD: 108 ML/MIN/1.73SQ M
GLUCOSE P FAST SERPL-MCNC: 93 MG/DL (ref 65–99)
HCT VFR BLD AUTO: 44.2 % (ref 34.8–46.1)
HDLC SERPL-MCNC: 36 MG/DL
HGB BLD-MCNC: 14.3 G/DL (ref 11.5–15.4)
IMM GRANULOCYTES # BLD AUTO: 0.03 THOUSAND/UL (ref 0–0.2)
IMM GRANULOCYTES NFR BLD AUTO: 0 % (ref 0–2)
LDLC SERPL CALC-MCNC: 114 MG/DL (ref 0–100)
LYMPHOCYTES # BLD AUTO: 2.96 THOUSANDS/ΜL (ref 0.6–4.47)
LYMPHOCYTES NFR BLD AUTO: 31 % (ref 14–44)
MCH RBC QN AUTO: 32.1 PG (ref 26.8–34.3)
MCHC RBC AUTO-ENTMCNC: 32.4 G/DL (ref 31.4–37.4)
MCV RBC AUTO: 99 FL (ref 82–98)
MONOCYTES # BLD AUTO: 0.62 THOUSAND/ΜL (ref 0.17–1.22)
MONOCYTES NFR BLD AUTO: 7 % (ref 4–12)
NEUTROPHILS # BLD AUTO: 5.48 THOUSANDS/ΜL (ref 1.85–7.62)
NEUTS SEG NFR BLD AUTO: 56 % (ref 43–75)
NONHDLC SERPL-MCNC: 145 MG/DL
NRBC BLD AUTO-RTO: 0 /100 WBCS
PLATELET # BLD AUTO: 293 THOUSANDS/UL (ref 149–390)
PMV BLD AUTO: 9.9 FL (ref 8.9–12.7)
POTASSIUM SERPL-SCNC: 4 MMOL/L (ref 3.5–5.3)
PROT SERPL-MCNC: 7.1 G/DL (ref 6.4–8.2)
RBC # BLD AUTO: 4.45 MILLION/UL (ref 3.81–5.12)
SODIUM SERPL-SCNC: 139 MMOL/L (ref 136–145)
T4 FREE SERPL-MCNC: 0.88 NG/DL (ref 0.76–1.46)
TRIGL SERPL-MCNC: 154 MG/DL
TSH SERPL DL<=0.05 MIU/L-ACNC: 3.84 UIU/ML (ref 0.36–3.74)
WBC # BLD AUTO: 9.6 THOUSAND/UL (ref 4.31–10.16)

## 2020-09-01 PROCEDURE — 36415 COLL VENOUS BLD VENIPUNCTURE: CPT

## 2020-09-01 PROCEDURE — 80061 LIPID PANEL: CPT

## 2020-09-01 PROCEDURE — 84439 ASSAY OF FREE THYROXINE: CPT

## 2020-09-01 PROCEDURE — 85025 COMPLETE CBC W/AUTO DIFF WBC: CPT

## 2020-09-01 PROCEDURE — 80053 COMPREHEN METABOLIC PANEL: CPT

## 2020-09-01 PROCEDURE — 84443 ASSAY THYROID STIM HORMONE: CPT

## 2021-04-01 DIAGNOSIS — Z23 ENCOUNTER FOR IMMUNIZATION: ICD-10-CM

## 2021-05-14 ENCOUNTER — OFFICE VISIT (OUTPATIENT)
Dept: URGENT CARE | Facility: CLINIC | Age: 41
End: 2021-05-14
Payer: COMMERCIAL

## 2021-05-14 ENCOUNTER — APPOINTMENT (OUTPATIENT)
Dept: RADIOLOGY | Facility: CLINIC | Age: 41
End: 2021-05-14
Payer: COMMERCIAL

## 2021-05-14 VITALS
HEIGHT: 69 IN | HEART RATE: 88 BPM | DIASTOLIC BLOOD PRESSURE: 86 MMHG | WEIGHT: 200 LBS | SYSTOLIC BLOOD PRESSURE: 148 MMHG | OXYGEN SATURATION: 99 % | RESPIRATION RATE: 18 BRPM | TEMPERATURE: 97.8 F | BODY MASS INDEX: 29.62 KG/M2

## 2021-05-14 DIAGNOSIS — M79.672 ACUTE FOOT PAIN, LEFT: Primary | ICD-10-CM
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
## 2021-05-14 DIAGNOSIS — M79.672 ACUTE FOOT PAIN, LEFT: ICD-10-CM

## 2021-05-14 PROCEDURE — 99213 OFFICE O/P EST LOW 20 MIN: CPT | Performed by: NURSE PRACTITIONER

## 2021-05-14 PROCEDURE — 73630 X-RAY EXAM OF FOOT: CPT

## 2021-05-14 NOTE — PROGRESS NOTES
[facility]        NAME: Antony Robertson is a 41 y o  female  : 1980    MRN: [id number]  DATE: May 14, 2021  TIME: 6:03 PM      Assessment and Plan     Acute foot pain, left [M79 672]  1  Acute foot pain, left  XR foot 3+ vw left    Cam Boot   2  Fall, initial encounter  XR foot 3+ vw left    Cam Boot         Patient Instructions     Patient Instructions   No fractures seen on x-ray  Radiology does the final read; if they see anything I did not, we will call you  While this could be a contusion (smack or crush type injury), I think this is most likely a "compensatory injury"--your right hip hurts which causes you to walk differently  Favoring your right hip is irritating your left foot, causing the pain and trace swelling  Tricky thing is that the more you favor your left foot, the more you irritate the right hip  Definitely keep your [facility] appointment  since addressing the right hip pain will in turn help the left foot pain (& let them know about this)  In the meantime, use the cam boot and/or crutches as needed  You may use up to 600 mg ibuprofen (Advil) every 6 hours as needed OR up to 440 mg naproxen (Aleve) every 12 hours as needed; take these with food to decrease risk of stomach irritation  Tylenol may be used per bottle directions as needed in addition to the ibuprofen/naproxen  Foot Contusion   WHAT YOU NEED TO KNOW:   A foot contusion is a bruise to the foot  DISCHARGE INSTRUCTIONS:   Medicines:   · NSAIDs:  These medicines decrease swelling and pain  NSAIDs are available without a doctor's order  Ask your healthcare provider which medicine is right for you  Ask how much to take and when to take it  Take as directed  NSAIDs can cause stomach bleeding and kidney problems if not taken correctly  · Take your medicine as directed  Contact your healthcare provider if you think your medicine is not helping or if you have side effects   Tell him of her if you are allergic to any medicine  Keep a list of the medicines, vitamins, and herbs you take  Include the amounts, and when and why you take them  Bring the list or the pill bottles to follow-up visits  Carry your medicine list with you in case of an emergency  Follow up with your healthcare provider as directed:  Write down your questions so you remember to ask them during your visits  Care for your foot: Follow your treatment plan to help decrease your pain and improve your muscle movement  · Rest:  You will need to rest your foot for 1 to 2 days after your injury  This will help decrease the risk of more damage  · Ice:  Ice helps decrease swelling and pain  Ice may also help prevent tissue damage  Use an ice pack, or put crushed ice in a plastic bag  Cover it with a towel and place it on your foot for 15 to 20 minutes every hour or as directed  · Compression:  Compression (tight hold) provides support and helps decrease swelling and movement so your foot can heal  You may be told to keep your foot wrapped with a tight elastic bandage  Follow instructions about how to apply your bandage  Do not massage your foot  You could cause more damage or pain  · Elevation:  Keep your foot raised above the level of your heart while you are sitting or lying down  This will help decrease or limit swelling  Use pillows, blankets, or rolled towels to elevate your foot comfortably  Exercise your foot:  You may be given gentle exercises to improve your foot movement and help decrease stiffness  Ask when you can return to your normal activities or sports  Prevent another injury:   · Wear equipment to protect yourself when you play sports  · Make sure your shoes fit properly  · Always wear shoes on streets or sidewalks  · Clean spills off the floor right away to avoid slipping or hitting your foot  · Make sure your home is well lit when you get up during the night   This will help you avoid hurting your foot in the dark  Contact your healthcare provider if:   · You have increased swelling on your foot  · You have severe foot pain  · You are not able to move your foot  · You have questions or concerns about your injury or treatment  © Copyright [address] Information is for End User's use only and may not be sold, redistributed or otherwise used for commercial purposes  All illustrations and images included in CareNotes® are the copyrighted property of A D A M , Inc  or Giselle Morales   The above information is an  only  It is not intended as medical advice for individual conditions or treatments  Talk to your doctor, nurse or pharmacist before following any medical regimen to see if it is safe and effective for you  Follow up with PCP in 3-5 days  Proceed to  ER if symptoms worsen  Chief Complaint     Chief Complaint   Patient presents with    Foot Pain     fell 2 days ago and now c/o L foot pain         History of Present Illness       Patient presents for evaluation of left foot pain  She has had ongoing problems with her right hip, diagnosed with arthritis, has received cortisone injections at [facility] several times over the last few years  She shares that since she is so young, they do not want to do a hip replacement yet, but that her hip is bad enough that if she was older she would have had replacement likely  Tuesday night she put something in the trash and then turned towards her Fridge  She states that her hip gave out and she fell  She does not remember hitting her left foot although she states having quickly and is possible  She primarily had right hip pain that Tuesday night without much foot pain  Throughout the day Wednesday and Thursday the more she walked around, the worse the left foot pain became  While her foot did not seem visibly swollen, she noticed that the top of her foot was rubbing against her dress shoe    She contacted her friend and was able to borrow crutches which she used to get through the work day today  She works as  at [employer], and while part of her job is sedentary, 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  She did contact [facility] this week due to the increased right hip pain post fall and has an appointment scheduled for June 1st       Review of Systems     Review of Systems   Musculoskeletal: Positive for arthralgias and joint swelling  All other systems reviewed and are negative  Current Medications       Current Outpatient Medications:     ALPRAZolam (XANAX) 0 5 mg tablet, Take by mouth as needed for anxiety, Disp: , Rfl:     DULoxetine (CYMBALTA) 60 mg delayed release capsule, Take 60 mg by mouth daily, Disp: , Rfl:     methimazole (TAPAZOLE) 5 mg tablet, Take 5 mg by mouth daily, Disp: , Rfl:     simvastatin (ZOCOR) 20 mg tablet, Take 20 mg by mouth daily at bedtime, Disp: , Rfl:     calcium carbonate-vitamin D (OSCAL-D) 500 mg-200 units per tablet, Take 1 tablet by mouth daily, Disp: , Rfl:     dicyclomine (BENTYL) 20 mg tablet, Take 1 tablet by mouth every 6 (six) hours as needed (diarrhea), Disp: 10 tablet, Rfl: 0    fluticasone (FLONASE) 50 mcg/act nasal spray, 2 sprays into each nostril daily, Disp: 16 g, Rfl: 0    Current Allergies     Allergies as of 05/14/2021    (No Known Allergies)              The following portions of the patient's history were reviewed and updated as appropriate: allergies, current medications, past family history, past medical history, past social history, past surgical history and problem list      Past Medical History:   Diagnosis Date    Depression     Disease of thyroid gland        Past Surgical History:   Procedure Laterality Date    EYE SURGERY         Family History   Problem Relation Age of Onset    Heart disease Mother     Heart disease Father          Medications have been verified          Objective     /86 Pulse 88   Temp 97 8 °F (36 6 °C)   Resp 18   Ht 5' 9" (1 753 m)   Wt 90 7 kg (200 lb)   SpO2 99%   BMI 29 53 kg/m²   No LMP recorded  Physical Exam     Physical Exam  Vitals signs and nursing note reviewed  Constitutional:       General: She is not in acute distress  Appearance: Normal appearance  She is well-developed  She is not ill-appearing, toxic-appearing or diaphoretic  HENT:      Head: Normocephalic and atraumatic  Eyes:      Pupils: Pupils are equal, round, and reactive to light  Pulmonary:      Effort: Pulmonary effort is normal  No respiratory distress  Abdominal:      General: There is no distension  Palpations: Abdomen is soft  Musculoskeletal: Normal range of motion  General: Swelling (trace), tenderness and signs of injury present  Left ankle: Normal       Left lower leg: Normal       Left foot: Normal range of motion (normal ROM, but guards due to pain) and normal capillary refill  Tenderness, bony tenderness (1st and 2nd metatarsals) and swelling (trace) present  No crepitus, deformity or laceration  Skin:     General: Skin is warm and dry  Capillary Refill: Capillary refill takes less than 2 seconds  Neurological:      General: No focal deficit present  Mental Status: She is alert and oriented to person, place, and time  Psychiatric:         Mood and Affect: Mood normal          Behavior: Behavior normal          Thought Content:  Thought content normal          Judgment: Judgment normal

## 2021-05-14 NOTE — PATIENT INSTRUCTIONS
No fractures seen on x-ray  Radiology does the final read; if they see anything I did not, we will call you  While this could be a contusion (smack or crush type injury), I think this is most likely a "compensatory injury"--your right hip hurts which causes you to walk differently  Favoring your right hip is irritating your left foot, causing the pain and trace swelling  Tricky thing is that the more you favor your left foot, the more you irritate the right hip  Definitely keep your [facility] appointment June 1 since addressing the right hip pain will in turn help the left foot pain (& let them know about this)  In the meantime, use the cam boot and/or crutches as needed  You may use up to 600 mg ibuprofen (Advil) every 6 hours as needed OR up to 440 mg naproxen (Aleve) every 12 hours as needed; take these with food to decrease risk of stomach irritation  Tylenol may be used per bottle directions as needed in addition to the ibuprofen/naproxen  Foot Contusion   WHAT YOU NEED TO KNOW:   A foot contusion is a bruise to the foot  DISCHARGE INSTRUCTIONS:   Medicines:   · NSAIDs:  These medicines decrease swelling and pain  NSAIDs are available without a doctor's order  Ask your healthcare provider which medicine is right for you  Ask how much to take and when to take it  Take as directed  NSAIDs can cause stomach bleeding and kidney problems if not taken correctly  · Take your medicine as directed  Contact your healthcare provider if you think your medicine is not helping or if you have side effects  Tell him of her if you are allergic to any medicine  Keep a list of the medicines, vitamins, and herbs you take  Include the amounts, and when and why you take them  Bring the list or the pill bottles to follow-up visits  Carry your medicine list with you in case of an emergency  Follow up with your healthcare provider as directed:  Write down your questions so you remember to ask them during your visits  Care for your foot: Follow your treatment plan to help decrease your pain and improve your muscle movement  · Rest:  You will need to rest your foot for 1 to 2 days after your injury  This will help decrease the risk of more damage  · Ice:  Ice helps decrease swelling and pain  Ice may also help prevent tissue damage  Use an ice pack, or put crushed ice in a plastic bag  Cover it with a towel and place it on your foot for 15 to 20 minutes every hour or as directed  · Compression:  Compression (tight hold) provides support and helps decrease swelling and movement so your foot can heal  You may be told to keep your foot wrapped with a tight elastic bandage  Follow instructions about how to apply your bandage  Do not massage your foot  You could cause more damage or pain  · Elevation:  Keep your foot raised above the level of your heart while you are sitting or lying down  This will help decrease or limit swelling  Use pillows, blankets, or rolled towels to elevate your foot comfortably  Exercise your foot:  You may be given gentle exercises to improve your foot movement and help decrease stiffness  Ask when you can return to your normal activities or sports  Prevent another injury:   · Wear equipment to protect yourself when you play sports  · Make sure your shoes fit properly  · Always wear shoes on streets or sidewalks  · Clean spills off the floor right away to avoid slipping or hitting your foot  · Make sure your home is well lit when you get up during the night  This will help you avoid hurting your foot in the dark  Contact your healthcare provider if:   · You have increased swelling on your foot  · You have severe foot pain  · You are not able to move your foot  · You have questions or concerns about your injury or treatment      © Copyright [address] Information is for End User's use only and may not be sold, redistributed or otherwise used for commercial purposes  All illustrations and images included in CareNotes® are the copyrighted property of A D A M , Inc  or Giselle Lorenzo  The above information is an  only  It is not intended as medical advice for individual conditions or treatments  Talk to your doctor, nurse or pharmacist before following any medical regimen to see if it is safe and effective for you

## 2021-09-16 PROCEDURE — U0005 INFEC AGEN DETEC AMPLI PROBE: HCPCS | Performed by: NURSE PRACTITIONER

## 2021-09-16 PROCEDURE — U0003 INFECTIOUS AGENT DETECTION BY NUCLEIC ACID (DNA OR RNA); SEVERE ACUTE RESPIRATORY SYNDROME CORONAVIRUS 2 (SARS-COV-2) (CORONAVIRUS DISEASE [COVID-19]), AMPLIFIED PROBE TECHNIQUE, MAKING USE OF HIGH THROUGHPUT TECHNOLOGIES AS DESCRIBED BY CMS-2020-01-R: HCPCS | Performed by: NURSE PRACTITIONER

## 2021-09-23 PROCEDURE — U0005 INFEC AGEN DETEC AMPLI PROBE: HCPCS | Performed by: NURSE PRACTITIONER

## 2021-09-23 PROCEDURE — U0003 INFECTIOUS AGENT DETECTION BY NUCLEIC ACID (DNA OR RNA); SEVERE ACUTE RESPIRATORY SYNDROME CORONAVIRUS 2 (SARS-COV-2) (CORONAVIRUS DISEASE [COVID-19]), AMPLIFIED PROBE TECHNIQUE, MAKING USE OF HIGH THROUGHPUT TECHNOLOGIES AS DESCRIBED BY CMS-2020-01-R: HCPCS | Performed by: NURSE PRACTITIONER

## 2022-05-09 ENCOUNTER — TRANSCRIBE ORDERS (OUTPATIENT)
Dept: URGENT CARE | Facility: CLINIC | Age: 42
End: 2022-05-09

## 2022-05-09 ENCOUNTER — CLINICAL SUPPORT (OUTPATIENT)
Dept: URGENT CARE | Facility: CLINIC | Age: 42
End: 2022-05-09
Payer: COMMERCIAL

## 2022-05-09 ENCOUNTER — APPOINTMENT (OUTPATIENT)
Dept: LAB | Facility: MEDICAL CENTER | Age: 42
End: 2022-05-09
Payer: COMMERCIAL

## 2022-05-09 DIAGNOSIS — Z01.818 PRE-OP TESTING: Primary | ICD-10-CM

## 2022-05-09 DIAGNOSIS — Z01.818 OTHER SPECIFIED PRE-OPERATIVE EXAMINATION: ICD-10-CM

## 2022-05-09 DIAGNOSIS — Z01.89 ENCOUNTER FOR SPECIAL NEEDS ASSESSMENT: ICD-10-CM

## 2022-05-09 LAB
ALBUMIN SERPL BCP-MCNC: 3.9 G/DL (ref 3.5–5)
ALP SERPL-CCNC: 69 U/L (ref 46–116)
ALT SERPL W P-5'-P-CCNC: 33 U/L (ref 12–78)
ANION GAP SERPL CALCULATED.3IONS-SCNC: 3 MMOL/L (ref 4–13)
AST SERPL W P-5'-P-CCNC: 16 U/L (ref 5–45)
ATRIAL RATE: 67 BPM
BASOPHILS # BLD AUTO: 0.04 THOUSANDS/ΜL (ref 0–0.1)
BASOPHILS NFR BLD AUTO: 1 % (ref 0–1)
BILIRUB SERPL-MCNC: 0.62 MG/DL (ref 0.2–1)
BUN SERPL-MCNC: 13 MG/DL (ref 5–25)
CALCIUM SERPL-MCNC: 9.5 MG/DL (ref 8.3–10.1)
CHLORIDE SERPL-SCNC: 107 MMOL/L (ref 100–108)
CO2 SERPL-SCNC: 30 MMOL/L (ref 21–32)
CREAT SERPL-MCNC: 0.8 MG/DL (ref 0.6–1.3)
EOSINOPHIL # BLD AUTO: 0.27 THOUSAND/ΜL (ref 0–0.61)
EOSINOPHIL NFR BLD AUTO: 3 % (ref 0–6)
ERYTHROCYTE [DISTWIDTH] IN BLOOD BY AUTOMATED COUNT: 11.9 % (ref 11.6–15.1)
GFR SERPL CREATININE-BSD FRML MDRD: 90 ML/MIN/1.73SQ M
GLUCOSE P FAST SERPL-MCNC: 125 MG/DL (ref 65–99)
HCT VFR BLD AUTO: 42 % (ref 34.8–46.1)
HGB BLD-MCNC: 14 G/DL (ref 11.5–15.4)
IMM GRANULOCYTES # BLD AUTO: 0.02 THOUSAND/UL (ref 0–0.2)
IMM GRANULOCYTES NFR BLD AUTO: 0 % (ref 0–2)
LYMPHOCYTES # BLD AUTO: 2.96 THOUSANDS/ΜL (ref 0.6–4.47)
LYMPHOCYTES NFR BLD AUTO: 35 % (ref 14–44)
MCH RBC QN AUTO: 32 PG (ref 26.8–34.3)
MCHC RBC AUTO-ENTMCNC: 33.3 G/DL (ref 31.4–37.4)
MCV RBC AUTO: 96 FL (ref 82–98)
MONOCYTES # BLD AUTO: 0.57 THOUSAND/ΜL (ref 0.17–1.22)
MONOCYTES NFR BLD AUTO: 7 % (ref 4–12)
NEUTROPHILS # BLD AUTO: 4.55 THOUSANDS/ΜL (ref 1.85–7.62)
NEUTS SEG NFR BLD AUTO: 54 % (ref 43–75)
NRBC BLD AUTO-RTO: 0 /100 WBCS
P AXIS: 45 DEGREES
PLATELET # BLD AUTO: 290 THOUSANDS/UL (ref 149–390)
PMV BLD AUTO: 9.6 FL (ref 8.9–12.7)
POTASSIUM SERPL-SCNC: 3.8 MMOL/L (ref 3.5–5.3)
PR INTERVAL: 132 MS
PROT SERPL-MCNC: 7.7 G/DL (ref 6.4–8.2)
QRS AXIS: 23 DEGREES
QRSD INTERVAL: 88 MS
QT INTERVAL: 392 MS
QTC INTERVAL: 414 MS
RBC # BLD AUTO: 4.37 MILLION/UL (ref 3.81–5.12)
SODIUM SERPL-SCNC: 140 MMOL/L (ref 136–145)
T WAVE AXIS: 56 DEGREES
VENTRICULAR RATE: 67 BPM
WBC # BLD AUTO: 8.41 THOUSAND/UL (ref 4.31–10.16)

## 2022-05-09 PROCEDURE — 80053 COMPREHEN METABOLIC PANEL: CPT

## 2022-05-09 PROCEDURE — 85025 COMPLETE CBC W/AUTO DIFF WBC: CPT

## 2022-05-09 PROCEDURE — 93005 ELECTROCARDIOGRAM TRACING: CPT

## 2022-05-09 PROCEDURE — 93010 ELECTROCARDIOGRAM REPORT: CPT

## 2022-05-09 PROCEDURE — 36415 COLL VENOUS BLD VENIPUNCTURE: CPT

## 2022-05-09 NOTE — PROGRESS NOTES
Kam Muñoz had walk-in EKG completed on 05/09/22 at 11:53 AM by Rhiannon Lobo RN 
hard copy, drawn during this pregnancy

## 2022-05-16 ENCOUNTER — EVALUATION (OUTPATIENT)
Dept: PHYSICAL THERAPY | Facility: CLINIC | Age: 42
End: 2022-05-16
Payer: COMMERCIAL

## 2022-05-16 DIAGNOSIS — G89.29 CHRONIC RIGHT HIP PAIN: Primary | ICD-10-CM

## 2022-05-16 DIAGNOSIS — M25.551 CHRONIC RIGHT HIP PAIN: Primary | ICD-10-CM

## 2022-05-16 DIAGNOSIS — R26.9 GAIT ABNORMALITY: ICD-10-CM

## 2022-05-16 PROCEDURE — 97161 PT EVAL LOW COMPLEX 20 MIN: CPT | Performed by: PHYSICAL MEDICINE & REHABILITATION

## 2022-05-16 PROCEDURE — 97530 THERAPEUTIC ACTIVITIES: CPT | Performed by: PHYSICAL MEDICINE & REHABILITATION

## 2022-05-16 PROCEDURE — 97110 THERAPEUTIC EXERCISES: CPT | Performed by: PHYSICAL MEDICINE & REHABILITATION

## 2022-05-16 NOTE — LETTER
May 20, 4448    [address] [place] [id number]    Patient: Joey Watson   YOB: 1980   Date of Visit: 2022     Encounter Diagnosis     ICD-10-CM    1  Chronic right hip pain  M25 551     G89 29    2  Gait abnormality  R26 9        Dear Dr Rizwana Sheth: Thank you for your recent referral of Joey Watson  Please review the attached evaluation summary from [facility] recent visit  Please verify that you agree with the plan of care by signing the attached order  If you have any questions or concerns, please do not hesitate to call  I sincerely appreciate the opportunity to share in the care of one of your patients and hope to have another opportunity to work with you in the near future  Sincerely,    Trevon Portillo, PT      Referring Provider:      I certify that I have read the below Plan of Care and certify the need for these services furnished under this plan of treatment while under my care  MD [address] [place] [address]  Via Fax: [phone]          PT Evaluation     Today's date: 2022  Patient name: Joey Watson  : 1980  MRN: [id number]  Referring provider: Melani Craig MD  Dx:   Encounter Diagnosis     ICD-10-CM    1  Chronic right hip pain  M25 551     G89 29    2  Gait abnormality  R26 9                   Assessment  Assessment details: Joey Watson is a 35 y o  female presenting to outpatient physical therapy with noted impairments including chronic R hip pain, impaired soft tissue mobility, reduced range of motion, reduced strength, reduced joint positional awareness, altered gait and mobility, and reduced activity tolerance  Signs and symptoms at present are consistent with referring diagnosis of chronic R hip pain 2* hip OA  She is scheduled for a R TARAN ; presents to PT to day for 2 visits of pre-hab   Due to noted impairments, the patient's present functional limitations include difficulty with ADLs with increased need for assistance, reliance on medication and/or modalities for pain relief, reduced tolerance for functional mobility and activity, and difficulty completing work and home responsibilities  Patient to benefit from skilled outpatient physical therapy 2 visits pre op then 2x/week for 6-8 weeks post op in order to reduce pain, maximize pain free range of motion, increase strength and stability, and improve functional mobility/functional activity in order to maximize return to prior level of function with reduced limitations  Home exercise program was provided and all questions answered to patient's level of satisfaction  Thank you for your referral       Impairments: abnormal gait, abnormal muscle tone, abnormal or restricted ROM, abnormal movement, activity intolerance, impaired physical strength, lacks appropriate home exercise program, pain with function and weight-bearing intolerance    Symptom irritability: highUnderstanding of Dx/Px/POC: good   Prognosis: fair    Goals  STGs/LTGs To be achieved post op/from first RE following surgery     STGs to be achieved in 4-6 weeks:    1  Pt will report reduced R hip pain levels "at worst" by at least 2 points (0-10 scale) in order to allow improved tolerance for functional mobility and reduced reliance on medication or modalities for pain relief  2  Pt will demonstrate improved AROM of R hip grossly by at least 10-15* (as allowable per hip precautions) in order to reduce pt difficulty with gait and transfers and restore normal joint mobility  3  Pt will demonstrate improved strength of R hip and knee grossly by at least 1/2-1 MMT in order to improve weight bearing joint stability and allow for restoration of normal joint mechanics to reduce pain and improve function     4 Pt will demonstrate ability to safely ambulate at least 100ft without AD with minimized gait deviations, independently, and without evidence of instability  LTGs to be achieved in 8-12 weeks:    1  Pt will be independent with HEP, demonstrating proper technique with exercises and understanding of self progression of program without need for cueing or assistance  2  Pt will report minimized pain levels with at least 80% reduction in pain since [facility]  3  Pt will demonstrate WFL AROM and strength of R hip/LE without increase in sx  4  Pt will demonstrate normalized gait for unlimited/self selected distances without deviations or need for assistive device or external support  5  Pt will report return to work and ADLs without limitations  6  FOTO will reflect score at discharge that is greater than or equal to predicted level  Plan  Plan details: RE to be completed post TARAN   Patient would benefit from: skilled physical therapy  Planned modality interventions: cryotherapy and thermotherapy: hydrocollator packs  Planned therapy interventions: balance, manual therapy, motor coordination training, neuromuscular re-education, patient education, self care, strengthening, stretching, therapeutic activities, therapeutic exercise, home exercise program, gait training and functional ROM exercises  Frequency: 2x week  Duration in visits: 12  Duration in weeks: 6  Plan of Care beginning date: 5/16/2022  Plan of Care expiration date: 6/27/2022  Treatment plan discussed with: patient        Subjective Evaluation    History of Present Illness  Mechanism of injury: Chronic R hip pain for the past 8 years  No known REMY/trauma  Started as sharp R hip/groin pain  Dx with R hip OA  Underwent trials of injections; most recent one "didn't work"  Minimal benefit with medications  Overall pain/sx progressively worsened  Pt notes now her R hip can buckle/give out with mobility  Pain is constant; c/c of pain in R groin  Increased pain with WB/Walking, donning socks/shoes, stair climbing, or pivoting on RLE   Pain with lifting R LE to get it in car or into bed  Using SPC  Poor tolerance for mobility  Uses WC for longer distances  Still working;  at [employer]  Requires assistance to put on socks/shoes or get off the floor  Unable to walk with her wife  No longer does any of the shopping  Unable to drive like she was before; < 10 min only  Unable to perform household chores  Scheduled for R TARAN   Will be returning home with wife; 2 story home; [address] with Hrs  Has 14 steps to next floor to bedroom/bathroom; with Hrs  Has a futon on first story; no bathroom on first floor but does have BSC  Has walker and WC and SPC  Will be having "Spairr total hip replacement" per pt  Presents to PT today with referral  From Dr Nicolás Rocha for Pre HAB x 2 visits then PT following R TARAN  Recurrent probem    Quality of life: poor    Pain  Current pain rating: 10  At best pain ratin  At worst pain rating: 10 (10+/10)  Location: R groin, R hip, R L/S , R glute, R thigh, R leg, R ankle   Quality: radiating, burning, sharp, knife-like and dull ache  Relieving factors: ice, rest and change in position  Progression: worsening    Social Support  Steps to enter house: yes  Stairs in house: yes   Lives in: multiple-level home  Lives with: spouse    Employment status: working  Hand dominance: right      Diagnostic Tests  X-ray: abnormal  Treatments  Previous treatment: injection treatment and medication  Current treatment: physical therapy  Current treatment comments: TARAN upcoming       Patient Goals  Patient goals for therapy: decreased pain, increased motion, increased strength, independence with ADLs/IADLs, return to work and return to sport/leisure activities          Objective     Neurological Testing     Sensation     Hip   Left Hip   Intact: light touch    Right Hip   Intact: light touch    Active Range of Motion   Left Hip   Normal active range of motion    Right Hip   Flexion: 85 degrees with pain  Extension: Right hip active extension: neutral  with pain  Abduction: Right hip active abduction: ~15* with pain  Adduction: Right hip active adduction: neutral    Left Knee   Normal active range of motion    Right Knee   Normal active range of motion  Flexion: with pain    Additional Active Range of Motion Details  Severely limited and painful R hip AROM all planes; limited by pain , mm guarding, and apprehension    R knee ROM WNL (130* flexion, 0* ext) with pain R hip/groin with R knee ROM   R ankle WNL/WFL      Passive Range of Motion     Right Hip   Flexion: 85 degrees with pain  External rotation (90/90): Right hip passive external rotation 90/90: ~20* with pain  Internal rotation (90/90):  Right hip passive internal rotation 90/90: 0* with pain    Additional Passive Range of Motion Details  Painful and limited R hip PROM all planes  Empty end feel; limited by pain, apprehension, and mm guarding  Will cont to assess upcoming      Strength/Myotome Testing     Left Hip   Planes of Motion   Flexion: 4+  Abduction: 4+  Adduction: 4+    Right Hip   Planes of Motion   Flexion: 3  Abduction: 3+  Adduction: 3+    Left Knee   Flexion: 4+  Extension: 4+  Quadriceps contraction: good    Right Knee   Flexion: 4-  Extension: 3+ (pain R hip/thigh/glute)  Quadriceps contraction: fair    Left Ankle/Foot   Dorsiflexion: 5  Plantar flexion: 5    Right Ankle/Foot   Dorsiflexion: 4-  Plantar flexion: 4-    Additional Strength Details  R hip MMT limited all planes with pain and apprehension       Ambulation     Quality of Movement During Gait     Additional Quality of Movement During Gait Details  Ambulates with SPC  Antalgic gait  Poor R stance time  Reduced R hip and knee flexion with swing   Reduced grant  Increased lateral flexion with R stance    Stairs- step to step with reliance on LLE and Hrs; painful              Precautions: hx depression, High sx irritability   R TARAN 5/31      Re-eval Date: NV    Date 5/16       Visit Count 1       FOTO 5/16       Pain In See IE       Pain Out See IE           Manuals 5/16                                       Neuro Re-Ed                                                                Ther Ex        Nustep vs [id number] [facility] as bisi        HS stretch seated/standing      Calf stretch         GS      QS 10x/10"      10x/10"       Hip add sets      Hip abd hooklying  10x/10"       APs       TA hooklying  10x/10"        10x/10" cues                                Ther Activity 10' total  Pt education regarding upcoming surgery, post op PT, post op hip precautions/mobility precautions, WB status,  issue/review of home preparation checklist to prepare for upcoming TARAN, answered all pt questions/concerns regarding upcoming TARAN and PT                        Gait Training                        Modalities                            5/16 - HEP was issued and reviewed this date for above noted exercises  Pt demonstrated understanding without incident and without questions/concerns  Will continue to update upcoming

## 2022-05-16 NOTE — PROGRESS NOTES
PT Evaluation     Today's date: 2022  Patient name: Nuzhat Magaña  : 1980  MRN: [id number]  Referring provider: Ronaldo Merrill MD  Dx:   Encounter Diagnosis     ICD-10-CM    1  Chronic right hip pain  M25 551     G89 29    2  Gait abnormality  R26 9                   Assessment  Assessment details: Nuzhat Magaña is a 35 y o  female presenting to outpatient physical therapy with noted impairments including chronic R hip pain, impaired soft tissue mobility, reduced range of motion, reduced strength, reduced joint positional awareness, altered gait and mobility, and reduced activity tolerance  Signs and symptoms at present are consistent with referring diagnosis of chronic R hip pain 2* hip OA  She is scheduled for a R TARAN ; presents to PT to day for 2 visits of pre-hab  Due to noted impairments, the patient's present functional limitations include difficulty with ADLs with increased need for assistance, reliance on medication and/or modalities for pain relief, reduced tolerance for functional mobility and activity, and difficulty completing work and home responsibilities  Patient to benefit from skilled outpatient physical therapy 2 visits pre op then 2x/week for 6-8 weeks post op in order to reduce pain, maximize pain free range of motion, increase strength and stability, and improve functional mobility/functional activity in order to maximize return to prior level of function with reduced limitations  Home exercise program was provided and all questions answered to patient's level of satisfaction   Thank you for your referral       Impairments: abnormal gait, abnormal muscle tone, abnormal or restricted ROM, abnormal movement, activity intolerance, impaired physical strength, lacks appropriate home exercise program, pain with function and weight-bearing intolerance    Symptom irritability: highUnderstanding of Dx/Px/POC: good   Prognosis: fair    Goals  STGs/LTGs To be achieved post op/from first RE following surgery     STGs to be achieved in 4-6 weeks:    1  Pt will report reduced R hip pain levels "at worst" by at least 2 points (0-10 scale) in order to allow improved tolerance for functional mobility and reduced reliance on medication or modalities for pain relief  2  Pt will demonstrate improved AROM of R hip grossly by at least 10-15* (as allowable per hip precautions) in order to reduce pt difficulty with gait and transfers and restore normal joint mobility  3  Pt will demonstrate improved strength of R hip and knee grossly by at least 1/2-1 MMT in order to improve weight bearing joint stability and allow for restoration of normal joint mechanics to reduce pain and improve function  4 Pt will demonstrate ability to safely ambulate at least 100ft without AD with minimized gait deviations, independently, and without evidence of instability  LTGs to be achieved in 8-12 weeks:    1  Pt will be independent with HEP, demonstrating proper technique with exercises and understanding of self progression of program without need for cueing or assistance  2  Pt will report minimized pain levels with at least 80% reduction in pain since [facility]  3  Pt will demonstrate WFL AROM and strength of R hip/LE without increase in sx  4  Pt will demonstrate normalized gait for unlimited/self selected distances without deviations or need for assistive device or external support  5  Pt will report return to work and ADLs without limitations  6  FOTO will reflect score at discharge that is greater than or equal to predicted level         Plan  Plan details: RE to be completed post TARAN   Patient would benefit from: skilled physical therapy  Planned modality interventions: cryotherapy and thermotherapy: hydrocollator packs  Planned therapy interventions: balance, manual therapy, motor coordination training, neuromuscular re-education, patient education, self care, strengthening, stretching, therapeutic activities, therapeutic exercise, home exercise program, gait training and functional ROM exercises  Frequency: 2x week  Duration in visits: 12  Duration in weeks: 6  Plan of Care beginning date: 2022  Plan of Care expiration date: 2022  Treatment plan discussed with: patient        Subjective Evaluation    History of Present Illness  Mechanism of injury: Chronic R hip pain for the past 8 years  No known REMY/trauma  Started as sharp R hip/groin pain  Dx with R hip OA  Underwent trials of injections; most recent one "didn't work"  Minimal benefit with medications  Overall pain/sx progressively worsened  Pt notes now her R hip can buckle/give out with mobility  Pain is constant; c/c of pain in R groin  Increased pain with WB/Walking, donning socks/shoes, stair climbing, or pivoting on RLE  Pain with lifting R LE to get it in car or into bed  Using SPC  Poor tolerance for mobility  Uses WC for longer distances  Still working;  at [employer]  Requires assistance to put on socks/shoes or get off the floor  Unable to walk with her wife  No longer does any of the shopping  Unable to drive like she was before; < 10 min only  Unable to perform household chores  Scheduled for R TARAN   Will be returning home with wife; 2 story home; [address] with Hrs  Has 14 steps to next floor to bedroom/bathroom; with Hrs  Has a futon on first story; no bathroom on first floor but does have BSC  Has walker and WC and SPC  Will be having "Spairr total hip replacement" per pt  Presents to PT today with referral  From Dr Jeff Salcedo for Pre HAB x 2 visits then PT following R TARAN             Recurrent probem    Quality of life: poor    Pain  Current pain rating: 10  At best pain ratin  At worst pain rating: 10 (10+/10)  Location: R groin, R hip, R L/S , R glute, R thigh, R leg, R ankle   Quality: radiating, burning, sharp, knife-like and dull ache  Relieving factors: ice, rest and change in position  Progression: worsening    Social Support  Steps to enter house: yes  Stairs in house: yes   Lives in: multiple-level home  Lives with: spouse    Employment status: working  Hand dominance: right      Diagnostic Tests  X-ray: abnormal  Treatments  Previous treatment: injection treatment and medication  Current treatment: physical therapy  Current treatment comments: TARAN upcoming  Patient Goals  Patient goals for therapy: decreased pain, increased motion, increased strength, independence with ADLs/IADLs, return to work and return to sport/leisure activities          Objective     Neurological Testing     Sensation     Hip   Left Hip   Intact: light touch    Right Hip   Intact: light touch    Active Range of Motion   Left Hip   Normal active range of motion    Right Hip   Flexion: 85 degrees with pain  Extension: Right hip active extension: neutral  with pain  Abduction: Right hip active abduction: ~15* with pain  Adduction: Right hip active adduction: neutral    Left Knee   Normal active range of motion    Right Knee   Normal active range of motion  Flexion: with pain    Additional Active Range of Motion Details  Severely limited and painful R hip AROM all planes; limited by pain , mm guarding, and apprehension    R knee ROM WNL (130* flexion, 0* ext) with pain R hip/groin with R knee ROM   R ankle WNL/WFL      Passive Range of Motion     Right Hip   Flexion: 85 degrees with pain  External rotation (90/90): Right hip passive external rotation 90/90: ~20* with pain  Internal rotation (90/90):  Right hip passive internal rotation 90/90: 0* with pain    Additional Passive Range of Motion Details  Painful and limited R hip PROM all planes  Empty end feel; limited by pain, apprehension, and mm guarding  Will cont to assess upcoming      Strength/Myotome Testing     Left Hip   Planes of Motion   Flexion: 4+  Abduction: 4+  Adduction: 4+    Right Hip   Planes of Motion   Flexion: 3  Abduction: 3+  Adduction: 3+    Left Knee   Flexion: 4+  Extension: 4+  Quadriceps contraction: good    Right Knee   Flexion: 4-  Extension: 3+ (pain R hip/thigh/glute)  Quadriceps contraction: fair    Left Ankle/Foot   Dorsiflexion: 5  Plantar flexion: 5    Right Ankle/Foot   Dorsiflexion: 4-  Plantar flexion: 4-    Additional Strength Details  R hip MMT limited all planes with pain and apprehension       Ambulation     Quality of Movement During Gait     Additional Quality of Movement During Gait Details  Ambulates with SPC  Antalgic gait  Poor R stance time  Reduced R hip and knee flexion with swing   Reduced grant  Increased lateral flexion with R stance    Stairs- step to step with reliance on LLE and Hrs; painful              Precautions: hx depression, High sx irritability   R TARAN 5/31      Re-eval Date: NV    Date 5/16       Visit Count 1       FOTO 5/16       Pain In See IE       Pain Out See IE           Manuals 5/16                                       Neuro Re-Ed                                                                Ther Ex        Nustep vs [id number] [facility] as bisi        HS stretch seated/standing      Calf stretch         GS      QS 10x/10"      10x/10"       Hip add sets      Hip abd hooklying  10x/10"       APs       TA hooklying  10x/10"        10x/10" cues                                Ther Activity 10' total  Pt education regarding upcoming surgery, post op PT, post op hip precautions/mobility precautions, WB status,  issue/review of home preparation checklist to prepare for upcoming TARAN, answered all pt questions/concerns regarding upcoming TARAN and PT                        Gait Training                        Modalities                            5/16 - HEP was issued and reviewed this date for above noted exercises  Pt demonstrated understanding without incident and without questions/concerns  Will continue to update upcoming

## 2022-05-18 ENCOUNTER — OFFICE VISIT (OUTPATIENT)
Dept: PHYSICAL THERAPY | Facility: CLINIC | Age: 42
End: 2022-05-18
Payer: COMMERCIAL

## 2022-05-18 ENCOUNTER — APPOINTMENT (OUTPATIENT)
Dept: PHYSICAL THERAPY | Facility: CLINIC | Age: 42
End: 2022-05-18
Payer: COMMERCIAL

## 2022-05-18 DIAGNOSIS — M25.551 CHRONIC RIGHT HIP PAIN: Primary | ICD-10-CM

## 2022-05-18 DIAGNOSIS — R26.9 GAIT ABNORMALITY: ICD-10-CM

## 2022-05-18 DIAGNOSIS — G89.29 CHRONIC RIGHT HIP PAIN: Primary | ICD-10-CM

## 2022-05-18 PROCEDURE — 97110 THERAPEUTIC EXERCISES: CPT

## 2022-05-18 NOTE — PROGRESS NOTES
Daily Note     Today's date: 2022  Patient name: uOmar Villalta  : 1980  MRN: [id number]  Referring provider: Pavel Rosales MD  Dx:   Encounter Diagnosis     ICD-10-CM    1  Chronic right hip pain  M25 551     G89 29    2  Gait abnormality  R26 9        Start Time: 1400  Stop Time: 1450  Total time in clinic (min): 50 minutes    Subjective: "I have so much pain and I don't understand why I have to come and waste my visits and pay my co-pay "      Objective: See treatment diary below      Assessment: Tolerated treatment poor  Increased pain with all R LE movements  Unable to perform nu-step 2* pain  Pain in R groin positioning into hooklying; slight relief with overpressure into groin region  Pt scheduled for surgery 22  Will resume post op  Patient demonstrated fatigue post treatment and would benefit from continued PT      Plan: Continue per plan of care  Progress treatment as tolerated         Precautions: hx depression, High sx irritability   R TARAN       Re-eval Date: NV    Date       Visit Count 1 2      FOTO        Pain In See IE 8/10      Pain Out See IE           Manuals                                       Neuro Re-Ed                                                                Ther Ex        Nustep vs [address] [facility] as bisi  unable      HS stretch seated/standing      Calf stretch         GS      QS 10x/10"      10x/10" 20x/3-5"      20x/3-5"      Hip add sets      Hip abd hooklying  10x/10" 20x/3-5"        Org 20x/3-5"      APs       TA hooklying  10x/10"        10x/10" cues          20x/3-5"      LAQ  2x10/3-5"                      Ther Activity 10' total  Pt education regarding upcoming surgery, post op PT, post op hip precautions/mobility precautions, WB status,  issue/review of home preparation checklist to prepare for upcoming TARAN, answered all pt questions/concerns regarding upcoming TARAN and PT                        Gait Training Modalities          CP supine R groin 10' no adverse reaction                  5/16 - HEP was issued and reviewed this date for above noted exercises  Pt demonstrated understanding without incident and without questions/concerns  Will continue to update upcoming

## 2022-06-06 ENCOUNTER — OFFICE VISIT (OUTPATIENT)
Dept: PHYSICAL THERAPY | Facility: CLINIC | Age: 42
End: 2022-06-06
Payer: COMMERCIAL

## 2022-06-06 DIAGNOSIS — R26.9 GAIT ABNORMALITY: ICD-10-CM

## 2022-06-06 DIAGNOSIS — M25.551 CHRONIC RIGHT HIP PAIN: Primary | ICD-10-CM

## 2022-06-06 DIAGNOSIS — G89.29 CHRONIC RIGHT HIP PAIN: Primary | ICD-10-CM

## 2022-06-06 DIAGNOSIS — Z96.641 H/O TOTAL HIP ARTHROPLASTY, RIGHT: ICD-10-CM

## 2022-06-06 PROCEDURE — 97164 PT RE-EVAL EST PLAN CARE: CPT | Performed by: PHYSICAL MEDICINE & REHABILITATION

## 2022-06-06 PROCEDURE — 97110 THERAPEUTIC EXERCISES: CPT | Performed by: PHYSICAL MEDICINE & REHABILITATION

## 2022-06-06 NOTE — LETTER
Indu 10, 0942    [address] [place] [id number]    Patient: Jhon Dasilva   YOB: 1980   Date of Visit: 2022     Encounter Diagnosis     ICD-10-CM    1  Chronic right hip pain  M25 551     G89 29    2  H/O total hip arthroplasty, right  Z96 641    3  Gait abnormality  R26 9        Dear Dr uGerrero Line: Thank you for your recent referral of Jhon Dasilva  Please review the attached evaluation summary from [facility] recent visit  Please verify that you agree with the plan of care by signing the attached order  If you have any questions or concerns, please do not hesitate to call  I sincerely appreciate the opportunity to share in the care of one of your patients and hope to have another opportunity to work with you in the near future  Sincerely,    Jerome Mora PT      Referring Provider:      I certify that I have read the below Plan of Care and certify the need for these services furnished under this plan of treatment while under my care  [address] [place] [id number]  Via Fax: [phone]          PT Re-Evaluation     Today's date: 2022  Patient name: Jhon Dasilva  : 1980  MRN: [id number]  Referring provider: Priya Johnson MD  Dx:   Encounter Diagnosis     ICD-10-CM    1  Chronic right hip pain  M25 551     G89 29    2  H/O total hip arthroplasty, right  Z96 641    3  Gait abnormality  R26 9                   Assessment  Assessment details: Jhon Dasilva is a 35 y o  female presenting to outpatient physical therapy with noted impairments including chronic R hip pain, impaired soft tissue mobility, reduced range of motion, reduced strength, reduced joint positional awareness, altered gait and mobility, and reduced activity tolerance  Signs and symptoms at present are consistent with referring diagnosis of chronic R hip pain 2* hip OA   She is s/p a R TARAN 5/31; presents to PT today for first post operative PT session  Due to noted impairments, the patient's present functional limitations include difficulty with ADLs and transfers with increased need for assistance, reliance on medication and/or modalities for pain relief, reliance on AD for mobility, reduced tolerance for functional mobility and activity, and difficulty completing work and home responsibilities  Patient to benefit from skilled outpatient physical therapy 2x/week for 4-6 weeks post op in order to reduce pain, maximize pain free range of motion, increase strength and stability, and improve functional mobility/functional activity in order to maximize return to prior level of function with reduced limitations  Home exercise program was provided and all questions answered to patient's level of satisfaction  Thank you for your referral       Impairments: abnormal gait, abnormal muscle firing, abnormal muscle tone, abnormal or restricted ROM, abnormal movement, activity intolerance, impaired balance, impaired physical strength, lacks appropriate home exercise program, pain with function and weight-bearing intolerance    Symptom irritability: highUnderstanding of Dx/Px/POC: good   Prognosis: fair    Goals      STGs to be achieved in 2-4 weeks:    1  Pt will report reduced R hip pain levels "at worst" by at least 2 points (0-10 scale) in order to allow improved tolerance for functional mobility and reduced reliance on medication or modalities for pain relief  2  Pt will demonstrate improved AROM of R hip grossly by at least 10-15* (as allowable per hip precautions) in order to reduce pt difficulty with gait and transfers and restore normal joint mobility  3  Pt will demonstrate improved strength of R hip and knee grossly by at least 1/2-1 MMT in order to improve weight bearing joint stability and allow for restoration of normal joint mechanics to reduce pain and improve function     4 Pt will demonstrate ability to safely ambulate at least 100ft without AD with minimized gait deviations, independently, and without evidence of instability  LTGs to be achieved in 6-8 weeks:    1  Pt will be independent with HEP, demonstrating proper technique with exercises and understanding of self progression of program without need for cueing or assistance  2  Pt will report minimized pain levels with at least 80% reduction in pain since [facility]  3  Pt will demonstrate WFL AROM and strength of R hip/LE without increase in sx  4  Pt will demonstrate normalized gait for unlimited/self selected distances without deviations or need for assistive device or external support  5  Pt will report return to work and ADLs without limitations  6  FOTO will reflect score at discharge that is greater than or equal to predicted level  Plan  Patient would benefit from: skilled physical therapy  Planned modality interventions: cryotherapy and thermotherapy: hydrocollator packs  Planned therapy interventions: balance, manual therapy, motor coordination training, neuromuscular re-education, patient education, self care, strengthening, stretching, therapeutic activities, therapeutic exercise, home exercise program, gait training and functional ROM exercises  Frequency: 2x week  Duration in visits: 12  Duration in weeks: 6  Plan of Care beginning date: 6/6/2022  Plan of Care expiration date: 7/18/2022  Treatment plan discussed with: patient and family        Subjective Evaluation    History of Present Illness  Mechanism of injury: Pt is s/p R TARAN (apparent posterior lateral approach) 5/31; d/c home 6/1  WBAT R LE with RW  Has been doing her initial HEP  Has been taking mm relaxer and Oxycodone and baby aspirin as Rx  Feels her pain has been well controlled with her medications; trying to wean down gradually  Has been regularly icing R hip  1* feeling pain in her groin on the R, R L/S, and tail bone   No n/t or sensory changes noted  Notes weakness R LE with mobility and bed mobility  Has been using her stairs at home with HR and SPC; step to step pattern  Requires A with bathing and dressing at this time  Notes she has been abiding by hip precautions (flexion not > 90*, no IR, no hip adduction) with difficulty with transfers  Dressing still intact R hip  F/u with surgeon's office              Recurrent probem    Quality of life: fair    Pain  Current pain ratin (with medication)  At best pain ratin  At worst pain ratin  Location: R groin, R hip, R L/S , R glute, R thigh  Quality: radiating, burning, sharp, knife-like and dull ache  Relieving factors: ice, rest, change in position and medications  Progression: no change ("different now")    Social Support  Steps to enter house: yes  Stairs in house: yes   Lives in: multiple-level home  Lives with: spouse    Employment status: working  Hand dominance: right      Diagnostic Tests  X-ray: abnormal  Treatments  Previous treatment: injection treatment and medication  Current treatment: medication and physical therapy  Patient Goals  Patient goals for therapy: decreased pain, increased motion, increased strength, independence with ADLs/IADLs, return to work and return to [employer] activities          Objective     Observations     Additional Observation Details  Post op Tegaderm/gauze intact ; removed today as per pt d/c instructions that state to remove 5-7 days post op; mesh dressing was left intact/in place; no present openings, drainage, or signs/sx infection noted; will cont ot monitor    Reduced R quad/VMO tone with QS  Pt wearing B PIPER stockings; minimal to no swelling noted   Will monitor     Tenderness     Additional Tenderness Details  Diffuse ttp about R hip post op  Will monitor     Neurological Testing     Sensation     Hip   Left Hip   Intact: light touch    Right Hip   Intact: light touch    Additional Neurological Details  Denies any n/t or sensory changes Active Range of Motion   Left Hip   Normal active range of motion    Right Hip   Flexion: 70 degrees with pain  Extension: Right hip active extension: -10* with pain  Abduction: Right hip active abduction: ~15* with pain  Adduction: Right hip active adduction: neutral    External rotation (90/90): Right hip active external rotation 90/90: TBA  Internal rotation (90/90): Right hip active internal rotation 90/90: NT    Left Knee   Normal active range of motion    Right Knee   Normal active range of motion  Flexion: with pain    Additional Active Range of Motion Details  Post operative TARAN precautions in place R hip  Painful and guarded with all RLE A/AAROM/PROM this date  Will cont to assess as able/as bisi and as appropriate     R knee flexion AAROM to 110*; extension to 0* with min A supine; limited by R hip pain and apprehension   R ankle WNL/WFL      Passive Range of Motion     Right Hip   External rotation (90/90): Right hip passive external rotation 90/90: ~20*   Internal rotation (90/90):  Right hip passive internal rotation 90/90: 0*     Additional Passive Range of Motion Details  TBA upcoming   R TARAN precautions in place         Strength/Myotome Testing     Left Hip   Planes of Motion   Flexion: 4+  Abduction: 4+  Adduction: 4+    Right Hip   Planes of Motion   Flexion: 3-  Abduction: 3-  Adduction: 3-    Left Knee   Flexion: 4+  Extension: 4+  Quadriceps contraction: good    Right Knee   Flexion: 3  Extension: 2+ (pain R hip/thigh/glute)  Quadriceps contraction: poor    Left Ankle/Foot   Dorsiflexion: 5  Plantar flexion: 5    Right Ankle/Foot   Dorsiflexion: 4-  Plantar flexion: 4-    Additional Strength Details  R hip MMT limited all planes with pain and apprehension   Requires Min-mod A for R LE with transfers/bed mobility; will cont to assess upcoming as bisi/as appropriate       Reduced R quad/VMO tone with QS  Requires Mod A x 1 for SAQ with weakness R quad     Ambulation     Quality of Movement During Gait     Additional Quality of Movement During Gait Details  WBAT RLE with RW  Reduced R stance time with pain and apprehension  Increased forward flexion with WB on RW with R stance   Reduced grant  Reduced R hip flexion with swing     Stairs- step to step with HR and cane per pt with use of LLE              Precautions: hx depression, High sx irritability   R TARAN 5/31      Re-eval Date: 7/18    Date 6/6       Visit Count 3 (first post op)       FOTO 6/6       Pain In See RE       Pain Out See RE           Manuals 6/6       R hip PROM (within total hip precautions) to tolerance         R hip flexor /quad stretch, R HS stretch, R calf stretch                         Neuro Re-Ed        Romberg    Tandem        Perturbation training        Dynamic balance training                                         Ther Ex        Nustep vs [id number] [facility] as bisi        Heel slides     AAROM supine 10x        GS      QS 10x/10"      10x/10"       Hip add sets      Hip abd hooklying  10x/10"      Standing- hip abd reviewed HEP       APs       TA hooklying  Reviewed         10x/10" cues        SAQ      Standing TR/HR 10x- mod A x1    reviewed       Standing HS curl    Standing hip 3 way  Reviewed        Mini squat      Step up         Ther Activity THR precautions reviewed with pt/family with bed mobility, gait, and transfers                        Gait Training                        Modalities                            6/6 - HEP was issued and reviewed this date for above noted exercises  Pt demonstrated understanding without incident and without questions/concerns  Will continue to update upcoming

## 2022-06-06 NOTE — PROGRESS NOTES
PT Re-Evaluation     Today's date: 2022  Patient name: Thom Fuller  : 1980  MRN: [id number]  Referring provider: John Wagoner MD  Dx:   Encounter Diagnosis     ICD-10-CM    1  Chronic right hip pain  M25 551     G89 29    2  H/O total hip arthroplasty, right  Z96 641    3  Gait abnormality  R26 9                   Assessment  Assessment details: Thom Fuller is a 35 y o  female presenting to outpatient physical therapy with noted impairments including chronic R hip pain, impaired soft tissue mobility, reduced range of motion, reduced strength, reduced joint positional awareness, altered gait and mobility, and reduced activity tolerance  Signs and symptoms at present are consistent with referring diagnosis of chronic R hip pain 2* hip OA  She is s/p a R TARAN ; presents to PT today for first post operative PT session  Due to noted impairments, the patient's present functional limitations include difficulty with ADLs and transfers with increased need for assistance, reliance on medication and/or modalities for pain relief, reliance on AD for mobility, reduced tolerance for functional mobility and activity, and difficulty completing work and home responsibilities  Patient to benefit from skilled outpatient physical therapy 2x/week for 4-6 weeks post op in order to reduce pain, maximize pain free range of motion, increase strength and stability, and improve functional mobility/functional activity in order to maximize return to prior level of function with reduced limitations  Home exercise program was provided and all questions answered to patient's level of satisfaction   Thank you for your referral       Impairments: abnormal gait, abnormal muscle firing, abnormal muscle tone, abnormal or restricted ROM, abnormal movement, activity intolerance, impaired balance, impaired physical strength, lacks appropriate home exercise program, pain with function and weight-bearing intolerance    Symptom irritability: highUnderstanding of Dx/Px/POC: good   Prognosis: fair    Goals      STGs to be achieved in 2-4 weeks:    1  Pt will report reduced R hip pain levels "at worst" by at least 2 points (0-10 scale) in order to allow improved tolerance for functional mobility and reduced reliance on medication or modalities for pain relief  2  Pt will demonstrate improved AROM of R hip grossly by at least 10-15* (as allowable per hip precautions) in order to reduce pt difficulty with gait and transfers and restore normal joint mobility  3  Pt will demonstrate improved strength of R hip and knee grossly by at least 1/2-1 MMT in order to improve weight bearing joint stability and allow for restoration of normal joint mechanics to reduce pain and improve function  4 Pt will demonstrate ability to safely ambulate at least 100ft without AD with minimized gait deviations, independently, and without evidence of instability  LTGs to be achieved in 6-8 weeks:    1  Pt will be independent with HEP, demonstrating proper technique with exercises and understanding of self progression of program without need for cueing or assistance  2  Pt will report minimized pain levels with at least 80% reduction in pain since [facility]  3  Pt will demonstrate WFL AROM and strength of R hip/LE without increase in sx  4  Pt will demonstrate normalized gait for unlimited/self selected distances without deviations or need for assistive device or external support  5  Pt will report return to work and ADLs without limitations  6  FOTO will reflect score at discharge that is greater than or equal to predicted level         Plan  Patient would benefit from: skilled physical therapy  Planned modality interventions: cryotherapy and thermotherapy: hydrocollator packs  Planned therapy interventions: balance, manual therapy, motor coordination training, neuromuscular re-education, patient education, self care, strengthening, stretching, therapeutic activities, therapeutic exercise, home exercise program, gait training and functional ROM exercises  Frequency: 2x week  Duration in visits: 12  Duration in weeks: 6  Plan of Care beginning date: 2022  Plan of Care expiration date: 2022  Treatment plan discussed with: patient and family        Subjective Evaluation    History of Present Illness  Mechanism of injury: Pt is s/p R TARAN (apparent posterior lateral approach) ; d/c home   WBAT R LE with RW  Has been doing her initial HEP  Has been taking mm relaxer and Oxycodone and baby aspirin as Rx  Feels her pain has been well controlled with her medications; trying to wean down gradually  Has been regularly icing R hip  1* feeling pain in her groin on the R, R L/S, and tail bone  No n/t or sensory changes noted  Notes weakness R LE with mobility and bed mobility  Has been using her stairs at home with HR and SPC; step to step pattern  Requires A with bathing and dressing at this time  Notes she has been abiding by hip precautions (flexion not > 90*, no IR, no hip adduction) with difficulty with transfers  Dressing still intact R hip  F/u with surgeon's office              Recurrent probem    Quality of life: fair    Pain  Current pain ratin (with medication)  At best pain ratin  At worst pain ratin  Location: R groin, R hip, R L/S , R glute, R thigh  Quality: radiating, burning, sharp, knife-like and dull ache  Relieving factors: ice, rest, change in position and medications  Progression: no change ("different now")    Social Support  Steps to enter house: yes  Stairs in house: yes   Lives in: multiple-level home  Lives with: spouse    Employment status: working  Hand dominance: right      Diagnostic Tests  X-ray: abnormal  Treatments  Previous treatment: injection treatment and medication  Current treatment: medication and physical therapy  Patient Goals  Patient goals for therapy: decreased pain, increased motion, increased strength, independence with ADLs/IADLs, return to work and return to sport/leisure activities          Objective     Observations     Additional Observation Details  Post op Tegaderm/gauze intact ; removed today as per pt d/c instructions that state to remove 5-7 days post op; mesh dressing was left intact/in place; no present openings, drainage, or signs/sx infection noted; will cont ot monitor    Reduced R quad/VMO tone with QS  Pt wearing B PIPER stockings; minimal to no swelling noted   Will monitor     Tenderness     Additional Tenderness Details  Diffuse ttp about R hip post op  Will monitor     Neurological Testing     Sensation     Hip   Left Hip   Intact: light touch    Right Hip   Intact: light touch    Additional Neurological Details  Denies any n/t or sensory changes       Active Range of Motion   Left Hip   Normal active range of motion    Right Hip   Flexion: 70 degrees with pain  Extension: Right hip active extension: -10* with pain  Abduction: Right hip active abduction: ~15* with pain  Adduction: Right hip active adduction: neutral    External rotation (90/90): Right hip active external rotation 90/90: TBA  Internal rotation (90/90): Right hip active internal rotation 90/90: NT    Left Knee   Normal active range of motion    Right Knee   Normal active range of motion  Flexion: with pain    Additional Active Range of Motion Details  Post operative TARAN precautions in place R hip  Painful and guarded with all RLE A/AAROM/PROM this date  Will cont to assess as able/as bisi and as appropriate     R knee flexion AAROM to 110*; extension to 0* with min A supine; limited by R hip pain and apprehension   R ankle WNL/WFL      Passive Range of Motion     Right Hip   External rotation (90/90): Right hip passive external rotation 90/90: ~20*   Internal rotation (90/90):  Right hip passive internal rotation 90/90: 0*     Additional Passive Range of Motion Details  TBA upcoming   R TARAN precautions in place         Strength/Myotome Testing     Left Hip   Planes of Motion   Flexion: 4+  Abduction: 4+  Adduction: 4+    Right Hip   Planes of Motion   Flexion: 3-  Abduction: 3-  Adduction: 3-    Left Knee   Flexion: 4+  Extension: 4+  Quadriceps contraction: good    Right Knee   Flexion: 3  Extension: 2+ (pain R hip/thigh/glute)  Quadriceps contraction: poor    Left Ankle/Foot   Dorsiflexion: 5  Plantar flexion: 5    Right Ankle/Foot   Dorsiflexion: 4-  Plantar flexion: 4-    Additional Strength Details  R hip MMT limited all planes with pain and apprehension   Requires Min-mod A for R LE with transfers/bed mobility; will cont to assess upcoming as bisi/as appropriate       Reduced R quad/VMO tone with QS  Requires Mod A x 1 for SAQ with weakness R quad     Ambulation     Quality of Movement During Gait     Additional Quality of Movement During Gait Details  WBAT RLE with RW  Reduced R stance time with pain and apprehension  Increased forward flexion with WB on RW with R stance   Reduced grant  Reduced R hip flexion with swing     Stairs- step to step with HR and cane per pt with use of LLE              Precautions: hx depression, High sx irritability   R TARAN 5/31      Re-eval Date: 7/18    Date 6/6       Visit Count 3 (first post op)       FOTO 6/6       Pain In See RE       Pain Out See RE           Manuals 6/6       R hip PROM (within total hip precautions) to tolerance         R hip flexor /quad stretch, R HS stretch, R calf stretch                         Neuro Re-Ed        Romberg    Tandem        Perturbation training        Dynamic balance training                                         Ther Ex        Nustep vs [id number] [facility] as bisi        Heel slides     AAROM supine 10x        GS      QS 10x/10"      10x/10"       Hip add sets      Hip abd hooklying  10x/10"      Standing- hip abd reviewed HEP       APs       TA hooklying  Reviewed         10x/10" cues        SAQ      Standing TR/HR 10x- mod A x1    reviewed Standing HS curl    Standing hip 3 way  Reviewed        Mini squat      Step up         Ther Activity THR precautions reviewed with pt/family with bed mobility, gait, and transfers                        Gait Training                        Modalities                            6/6 - HEP was issued and reviewed this date for above noted exercises  Pt demonstrated understanding without incident and without questions/concerns  Will continue to update upcoming

## 2022-06-09 ENCOUNTER — OFFICE VISIT (OUTPATIENT)
Dept: PHYSICAL THERAPY | Facility: CLINIC | Age: 42
End: 2022-06-09
Payer: COMMERCIAL

## 2022-06-09 DIAGNOSIS — R26.9 GAIT ABNORMALITY: ICD-10-CM

## 2022-06-09 DIAGNOSIS — M25.551 CHRONIC RIGHT HIP PAIN: Primary | ICD-10-CM

## 2022-06-09 DIAGNOSIS — G89.29 CHRONIC RIGHT HIP PAIN: Primary | ICD-10-CM

## 2022-06-09 DIAGNOSIS — Z96.641 H/O TOTAL HIP ARTHROPLASTY, RIGHT: ICD-10-CM

## 2022-06-09 PROCEDURE — 97110 THERAPEUTIC EXERCISES: CPT | Performed by: PHYSICAL MEDICINE & REHABILITATION

## 2022-06-09 PROCEDURE — 97140 MANUAL THERAPY 1/> REGIONS: CPT | Performed by: PHYSICAL MEDICINE & REHABILITATION

## 2022-06-09 NOTE — PROGRESS NOTES
Daily Note     Today's date: 2022  Patient name: Hubbard Nyhan  : 1980  MRN: [id number]  Referring provider: Arturo Kerns MD  Dx:   Encounter Diagnosis     ICD-10-CM    1  Chronic right hip pain  M25 551     G89 29    2  H/O total hip arthroplasty, right  Z96 641    3  Gait abnormality  R26 9                   Subjective: Pt notes her hip pain is about a 6/10 today  Overall does feel her hip is improving  Pain "used to be a 10/10 all the time"; now it "goes up but comes down a little"  Notes she has been compliant with HEP without incident  Tried to lay on her side but was unable to  Objective: See treatment diary below      Assessment: Tolerated treatment fair  Progressed slowly/cautiously with exercises as today was her first tx session  Fair-limited tolerance for exercises with pain R hip; very guarded with A/PROM R hip with pain  Limited tolerance for PROM R hip; flexion < 90* with pain and empty end feel; abd ~10-15* with pain and empty end feel; NO adduction or IR R hip 2* TARAN  Limited tolerance for R heel slides with R hip pain  Improved R quad activation with ability to perform SAQ without assistance today  Continues with slow, antalgic gait with reduced R stance time and increased forward flexion/WB on RW  No complaints after session  Patient demonstrated fatigue post treatment and would benefit from continued PT      Plan: Continue per plan of care  Progress treatment as tolerated         Precautions: hx depression, High sx irritability   R TARAN       Re-eval Date:     Date       Visit Count 3 (first post op) 4      FOTO        Pain In See RE 6/10      Pain Out See RE 7/10          Manuals       R hip PROM (within total hip precautions) to tolerance   GENTLE R hip PROM flexion <90*, abduction to tolerance, AAROM heel slides to tolerance  10' supine       R hip flexor /quad stretch, R HS stretch, R calf stretch                         Neuro Re-Ed Romberg    Tandem        Perturbation training        Dynamic balance training                                         Ther Ex        Nustep vs [address] [facility] as bisi  L1 10'       Heel slides     AAROM supine 10x        GS      QS 10x/10"      10x/10" 1x15/5"      2x10/5"      Hip add sets      Hip abd hooklying  10x/10"      Standing- hip abd reviewed HEP 3x10/5"      APs       TA hooklying  Reviewed         10x/10" cues        Reviewed HEP      SAQ      Standing TR/HR 10x- mod A x1    reviewed 1x10-15 /5" 0#      3x10 HRs      Standing HS curl    Standing hip 3 way  Reviewed  Reviewed HEP      2-3x10 ea 0# gentle AROM as bisi       Mini squat      Step up         Ther Activity THR precautions reviewed with pt/family with bed mobility, gait, and transfers                        Gait Training                        Modalities  CP x  10 '  R hip supine after tx  No adverse reaction to tx noted                           6/6 - HEP was issued and reviewed this date for above noted exercises  Pt demonstrated understanding without incident and without questions/concerns  Will continue to update upcoming

## 2022-06-13 ENCOUNTER — OFFICE VISIT (OUTPATIENT)
Dept: PHYSICAL THERAPY | Facility: CLINIC | Age: 42
End: 2022-06-13
Payer: COMMERCIAL

## 2022-06-13 DIAGNOSIS — R26.9 GAIT ABNORMALITY: ICD-10-CM

## 2022-06-13 DIAGNOSIS — M25.551 CHRONIC RIGHT HIP PAIN: Primary | ICD-10-CM

## 2022-06-13 DIAGNOSIS — G89.29 CHRONIC RIGHT HIP PAIN: Primary | ICD-10-CM

## 2022-06-13 DIAGNOSIS — Z96.641 H/O TOTAL HIP ARTHROPLASTY, RIGHT: ICD-10-CM

## 2022-06-13 PROCEDURE — 97140 MANUAL THERAPY 1/> REGIONS: CPT

## 2022-06-13 PROCEDURE — 97110 THERAPEUTIC EXERCISES: CPT

## 2022-06-13 PROCEDURE — 97112 NEUROMUSCULAR REEDUCATION: CPT

## 2022-06-13 NOTE — PROGRESS NOTES
Daily Note     Today's date: 2022  Patient name: Bryan Dickerson  : 1980  MRN: [id number]  Referring provider: Nelda Carey MD  Dx:   Encounter Diagnosis     ICD-10-CM    1  Chronic right hip pain  M25 551     G89 29    2  H/O total hip arthroplasty, right  Z96 641    3  Gait abnormality  R26 9                   Subjective: "My hip is getting better  It's different pain than I had before and I have to keep telling myself it will get better  I am able to move my leg out the side more and I do steps every morning and at night but step to step "      Objective: See treatment diary below      Assessment: Tolerated treatment well  Pt improving with strength and motion  Increased motion with hip ext and flexion  Quad strength increasing, able to perform SAQ with greater ease  R groin pain noted with PROM with returning to neutral and all AROM  Will continue to progress as able to address deficits  Patient demonstrated fatigue post treatment and would benefit from continued PT      Plan: Continue per plan of care  Progress treatment as tolerated         Precautions: hx depression, High sx irritability   R TARAN       Re-eval Date:     Date      Visit Count 3 (first post op) 4 5     FOTO        Pain In See RE 6/10 4/10     Pain Out See RE 7/10 4/10         Manuals      R hip PROM (within total hip precautions) to tolerance   GENTLE R hip PROM flexion <90*, abduction to tolerance, AAROM heel slides to tolerance  10' supine  GENTLE R hip PROM flexion <90*, abduction to tolerance, AAROM heel slides to tolerance  10' supine      R hip flexor /quad stretch, R HS stretch, R calf stretch                         Neuro Re-Ed        Romberg    Tandem        Perturbation training        Dynamic balance training                                         Ther Ex        Nustep vs [address] [facility] as bisi  L1 10'  L3 12'      Heel slides     AAROM supine 10x   2x15/3-5"     GS      QS 10x/10"      10x/10" 1x15/5"      2x10/5"       3x10/5"     Hip add sets      Hip abd hooklying  10x/10"      Standing- hip abd reviewed HEP 3x10/5" 3x10/5"     APs       TA hooklying  Reviewed         10x/10" cues        Reviewed HEP      SAQ      Standing TR/HR 10x- mod A x1    reviewed 1x10-15 /5" 0#      3x10 HRs sm red roll QS  2x15/3-5"        3x10 HRs     Standing HS curl    Standing hip 3 way  Reviewed  Reviewed HEP      2-3x10 ea 0# gentle AROM as bisi        3way  2-3x10 ea 0# gentle AROM as bisi     Mini squat      Step up         Ther Activity THR precautions reviewed with pt/family with bed mobility, gait, and transfers                        Gait Training                        Modalities  CP x  10 '  R hip supine after tx  No adverse reaction to tx noted  CP x  10 '  R hip supine after tx  No adverse reaction to tx noted                          6/6 - HEP was issued and reviewed this date for above noted exercises  Pt demonstrated understanding without incident and without questions/concerns  Will continue to update upcoming

## 2022-06-16 ENCOUNTER — OFFICE VISIT (OUTPATIENT)
Dept: PHYSICAL THERAPY | Facility: CLINIC | Age: 42
End: 2022-06-16
Payer: COMMERCIAL

## 2022-06-16 DIAGNOSIS — Z96.641 H/O TOTAL HIP ARTHROPLASTY, RIGHT: ICD-10-CM

## 2022-06-16 DIAGNOSIS — G89.29 CHRONIC RIGHT HIP PAIN: Primary | ICD-10-CM

## 2022-06-16 DIAGNOSIS — M25.551 CHRONIC RIGHT HIP PAIN: Primary | ICD-10-CM

## 2022-06-16 DIAGNOSIS — R26.9 GAIT ABNORMALITY: ICD-10-CM

## 2022-06-16 PROCEDURE — 97140 MANUAL THERAPY 1/> REGIONS: CPT | Performed by: PHYSICAL MEDICINE & REHABILITATION

## 2022-06-16 PROCEDURE — 97110 THERAPEUTIC EXERCISES: CPT | Performed by: PHYSICAL MEDICINE & REHABILITATION

## 2022-06-16 NOTE — PROGRESS NOTES
Daily Note     Today's date: 2022  Patient name: Shelbi Hannah  : 1980  MRN: [id number]  Referring provider: Audelia Rodriguez MD  Dx:   Encounter Diagnosis     ICD-10-CM    1  Chronic right hip pain  M25 551     G89 29    2  H/O total hip arthroplasty, right  Z96 641    3  Gait abnormality  R26 9                   Subjective: Pt noted she "felt great" yesterday  Notes however she has more "soreness/stiffness" today; feels it may be "because of the weather"  Notes pain remains 1* in R upper thigh/groin  Cont'd R LBP which she feels is from sitting/laying  No new sx/complaints  F/u with the surgeon   Is compliant with HEP without incident  Continues to note difficulty fully extending R hip in supine with discomfort anterior R hip/thigh  Objective: See treatment diary below      Assessment: Tolerated treatment well  Improved tolerance for A/AAROM R hip/knee noted from prior sessions  Min cues required to increase R hip and knee flexion with swing; improved return demo with gait with RW  Able to add mini squats and bridges without incident  Improved hip/knee flexion with heel slides  Able to attain hip extension and knee extension fully this date in supine; discomfort/stretching noted anterior R hip/thigh at end range; reduced with stretching  Reviewed self gentle hip extension supine and standing to tolerance to improve ROM  Able to easily attain 90* flexion R hip with PROM this date with empty end feel without guarding; cont with pain and limited ROM into abduction and ER  No complaints after tx  Patient demonstrated fatigue post treatment and would benefit from continued PT      Plan: Continue per plan of care  Progress treatment as tolerated         Precautions: hx depression, High sx irritability   R TARAN       Re-eval Date:     Date     Visit Count 3 (first post op) 4 5 6    FOTO        Pain In See RE 6/10 4/10 6/10    Pain Out See RE 7/10 4/10 7/10 Manuals 6/6 6/9 6/13 6/16    R hip PROM (within total hip precautions) to tolerance   GENTLE R hip PROM flexion <90*, abduction to tolerance, AAROM heel slides to tolerance  10' supine  GENTLE R hip PROM flexion <90*, abduction to tolerance, AAROM heel slides to tolerance  10' supine  GENTLE R hip PROM flexion 90*, abduction to tolerance, AAROM heel slides to tolerance, gentle hip ER to tolerance  10' supine     R hip flexor /quad stretch, R HS stretch, R calf stretch     Reviewed gentle stretch supine and standing to tolerance for HEP                    Neuro Re-Ed        Romberg    Tandem        Perturbation training        Dynamic balance training                                         Ther Ex        Nustep vs [address] [place] [address] as bisi  L1 10'  L3 12'  L1 10'     Heel slides     AAROM supine 10x   2x15/3-5" 2x10/5-10"     GS      QS 10x/10"      10x/10" 1x15/5"      2x10/5"       3x10/5"       HEP    Hip add sets      Hip abd hooklying  10x/10"      Standing- hip abd reviewed HEP 3x10/5" 3x10/5" HEP        Supine hip extension   Supine to neutral  4x20-30"     APs       TA hooklying  Reviewed         10x/10" cues        Reviewed HEP  Bridges 2x10/5"    SAQ          Standing TR/HR 10x- mod A x1        reviewed 1x10-15 /5" 0#        3x10 HRs sm red roll QS  2x15/3-5"        3x10 HRs LAQ 3x10/5" 0#          3x10     Standing HS curl    Standing hip 3 way  Reviewed  Reviewed HEP      2-3x10 ea 0# gentle AROM as bisi        3way  2-3x10 ea 0# gentle AROM as bisi HEP      3way  2x10 ea 0# gentle AROM as bisi    Mini squat      Step up     2x10/ 5" cues     Ther Activity THR precautions reviewed with pt/family with bed mobility, gait, and transfers                        Gait Training                        Modalities  CP x  10 '  R hip supine after tx  No adverse reaction to tx noted  CP x  10 '  R hip supine after tx  No adverse reaction to tx noted                          6/6 - HEP was issued and reviewed this date for above noted exercises  Pt demonstrated understanding without incident and without questions/concerns  Will continue to update upcoming

## 2022-06-20 ENCOUNTER — OFFICE VISIT (OUTPATIENT)
Dept: PHYSICAL THERAPY | Facility: CLINIC | Age: 42
End: 2022-06-20
Payer: COMMERCIAL

## 2022-06-20 DIAGNOSIS — G89.29 CHRONIC RIGHT HIP PAIN: Primary | ICD-10-CM

## 2022-06-20 DIAGNOSIS — M25.551 CHRONIC RIGHT HIP PAIN: Primary | ICD-10-CM

## 2022-06-20 DIAGNOSIS — Z96.641 H/O TOTAL HIP ARTHROPLASTY, RIGHT: ICD-10-CM

## 2022-06-20 DIAGNOSIS — R26.9 GAIT ABNORMALITY: ICD-10-CM

## 2022-06-20 PROCEDURE — 97110 THERAPEUTIC EXERCISES: CPT | Performed by: PHYSICAL MEDICINE & REHABILITATION

## 2022-06-20 PROCEDURE — 97116 GAIT TRAINING THERAPY: CPT | Performed by: PHYSICAL MEDICINE & REHABILITATION

## 2022-06-20 NOTE — PROGRESS NOTES
Daily Note     Today's date: 2022  Patient name: Rd Marroquin  : 1980  MRN: [id number]  Referring provider: Priscilla Combs MD  Dx:   Encounter Diagnosis     ICD-10-CM    1  Chronic right hip pain  M25 551     G89 29    2  H/O total hip arthroplasty, right  Z96 641    3  Gait abnormality  R26 9                   Subjective: Pt notes she remains concerned about work and if they are going to want her to come back sooner  She notes she is "in her head" about her progress; wondering if she is going too little or too much  Notes people have been comparing her to others after surgery and it makes her feel anxious  No new sx/complaints  RTD this Thursday; She notes she is concerned he will feel she is "not doing enough"  Pain is 1* R groin and in region of incision; still having some chronic back pain  Overall does feels she is making improvements  Objective: See treatment diary below      Assessment: Tolerated treatment well  Pt continues to be very anxious regarding her progress; provided positive reassurance to pt that she is steadily improving and doing overall well  Pt ambulated with [facility] t/o PT clinic today; tolerated well without incident; some pain in R groin with R stance phase; no evidence for imbalance/instability; Close S-CGA provided by PT; she notes fear of using the [facility] for fear of falling; advised pt to trial [facility] within home and trail short distances outdoors as long as she has Supervision and feels good; understanding/agreement noted  Improved tolerance for hip/knee extension in supine this date; cont with limited tolerance for neutral hip extension with tightness noted anterior R hip  Able to perform SLR supine this date with CGA-Maddi; denied pain noted mm weakness only  Unable to perform sidelying hip abd with pain  Remains compliant with HEP  Progressing steadily overall  Limited by apprehension and anxiety regarding her progress at times  No complaints after tx    Patient demonstrated fatigue post treatment and would benefit from continued PT      Plan: Continue per plan of care  Progress treatment as tolerated         Precautions: hx depression, High sx irritability   R TARAN 5/31      Re-eval Date: 7/18    Date 6/6 6/9 6/13 6/16 6/20   Visit Count 3 (first post op) 4 5 6 7   FOTO 6/6       Pain In See RE 6/10 4/10 6/10 4-5/10   Pain Out See RE 7/10 4/10 7/10 6/10       Manuals 6/6 6/9 6/13 6/16 6/20   R hip PROM (within total hip precautions) to tolerance   GENTLE R hip PROM flexion <90*, abduction to tolerance, AAROM heel slides to tolerance  10' supine  GENTLE R hip PROM flexion <90*, abduction to tolerance, AAROM heel slides to tolerance  10' supine  GENTLE R hip PROM flexion 90*, abduction to tolerance, AAROM heel slides to tolerance, gentle hip ER to tolerance  10' supine  GENTLE R hip PROM flexion 90*, abduction to tolerance, gentle hip ER to tolerance  5' supine    R hip flexor /quad stretch, R HS stretch, R calf stretch     Reviewed gentle stretch supine and standing to tolerance for HEP                    Neuro Re-Ed        Romberg    Tandem        Perturbation training        Dynamic balance training                                         Ther Ex        Nustep vs [address] [place] [address] as bisi  L1 10'  L3 12'  L1 10'  L3 10'    Heel slides     AAROM supine 10x   2x15/3-5" 2x10/5-10"  HEP   GS      QS 10x/10"      10x/10" 1x15/5"      2x10/5"       3x10/5"       HEP       Supine no rolls  15-20x 5"   Hip add sets      Hip abd hooklying  10x/10"      Standing- hip abd reviewed HEP 3x10/5" 3x10/5" HEP        Supine hip extension   Supine to neutral  4x20-30"  Supine SLR flexion Maddi   10x  ABD unable     Clamshell   Side lying R  10-15x 0#    APs       TA hooklying  Reviewed         10x/10" cues        Reviewed HEP  Bridges 2x10/5" Bridges 2x10/5"   SAQ          Standing TR/HR 10x- mod A x1        reviewed 1x10-15 /5" 0#        3x10 HRs sm red roll QS  2x15/3-5"        3x10 HRs LAQ 3x10/5" 0#          3x10  LAQ 3x10/5" 1#          3x10 HR   Standing HS curl    Standing hip 3 way  Reviewed  Reviewed HEP      2-3x10 ea 0# gentle AROM as bisi        3way  2-3x10 ea 0# gentle AROM as bisi HEP      3way  2x10 ea 0# gentle AROM as bisi         HEP    Mini squat      Step up     2x10/ 5" cues  2x10/ 5" cues    Ther Activity THR precautions reviewed with pt/family with bed mobility, gait, and transfers                        Gait Training     [facility]   T/o clinic  Cues for sequencing with SPC on L side; cues for increasing R hip/knee flexion with swing; CGA- close S  10' total                    Modalities  CP x  10 '  R hip supine after tx  No adverse reaction to tx noted  CP x  10 '  R hip supine after tx  No adverse reaction to tx noted   deferred                       6/6 - HEP was issued and reviewed this date for above noted exercises  Pt demonstrated understanding without incident and without questions/concerns  Will continue to update upcoming

## 2022-06-22 ENCOUNTER — OFFICE VISIT (OUTPATIENT)
Dept: PHYSICAL THERAPY | Facility: CLINIC | Age: 42
End: 2022-06-22
Payer: COMMERCIAL

## 2022-06-22 DIAGNOSIS — M25.551 CHRONIC RIGHT HIP PAIN: Primary | ICD-10-CM

## 2022-06-22 DIAGNOSIS — Z96.641 H/O TOTAL HIP ARTHROPLASTY, RIGHT: ICD-10-CM

## 2022-06-22 DIAGNOSIS — R26.9 GAIT ABNORMALITY: ICD-10-CM

## 2022-06-22 DIAGNOSIS — G89.29 CHRONIC RIGHT HIP PAIN: Primary | ICD-10-CM

## 2022-06-22 PROCEDURE — 97110 THERAPEUTIC EXERCISES: CPT

## 2022-06-22 PROCEDURE — 97116 GAIT TRAINING THERAPY: CPT

## 2022-06-22 NOTE — PROGRESS NOTES
Daily Note     Today's date: 2022  Patient name: Jhon Dasilva  : 1980  MRN: [id number]  Referring provider: Priya Johnson MD  Dx:   Encounter Diagnosis     ICD-10-CM    1  Chronic right hip pain  M25 551     G89 29    2  H/O total hip arthroplasty, right  Z96 641    3  Gait abnormality  R26 9        Start Time: 1435  Stop Time: 1525  Total time in clinic (min): 50 minutes    Subjective: "I think I over did it yesterday  I did a lot of standing and       Objective: See treatment diary below      Assessment: Tolerated treatment well  Able to complete all exercises with quick muscle fatigue and endurance  Lateral hip discomfort noted initially with decreased symptoms after exercises  Good grant using SPC, cuing for posture occasionally  Able to complete SLR without A or ext lag  Will continue to progress as able to address deficits  Patient demonstrated fatigue post treatment and would benefit from continued PT      Plan: Continue per plan of care  Progress treatment as tolerated         Precautions: hx depression, High sx irritability   R TARAN       Re-eval Date:     Date        Visit Count 8/10       FOTO        Pain In 3-10       Pain Out 2-310           Manuals        R hip PROM (within total hip precautions) to tolerance  GENTLE R hip PROM flexion 90*, abduction to tolerance, gentle hip ER to tolerance  5' supine        R hip flexor /quad stretch, R HS stretch, R calf stretch                         Neuro Re-Ed        Romberg    Tandem        Perturbation training        Dynamic balance training                                         Ther Ex        Nustep vs [facility] as bisi L3 10'        Heel slides            GS      QS     Supine no rolls  20-30x 5"       Hip add sets      Hip abd hooklying  Supine SLR flexion AROM   10x  ABD unable     Clamshell   Side lying R  10-15x 0#        APs       TA hooklying  Bridges 2x10/5"       SAQ          Standing TR/HR LAQ 3x10/5" 1 1/2#          3x10 HR       Standing HS curl    Standing hip 3 way         Mini squat      Step up  2x10/ 5" cues        Ther Activity THR precautions reviewed with pt/family with bed mobility, gait, and transfers                        Gait Training [facility]   T/o clinic  Cues for sequencing with SPC on L side; cues for increasing R hip/knee flexion with swing; CGA- close S  10' total                        Modalities                            6/6 - HEP was issued and reviewed this date for above noted exercises  Pt demonstrated understanding without incident and without questions/concerns  Will continue to update upcoming

## 2022-06-27 ENCOUNTER — OFFICE VISIT (OUTPATIENT)
Dept: PHYSICAL THERAPY | Facility: CLINIC | Age: 42
End: 2022-06-27
Payer: COMMERCIAL

## 2022-06-27 DIAGNOSIS — M25.551 CHRONIC RIGHT HIP PAIN: Primary | ICD-10-CM

## 2022-06-27 DIAGNOSIS — R26.9 GAIT ABNORMALITY: ICD-10-CM

## 2022-06-27 DIAGNOSIS — Z96.641 H/O TOTAL HIP ARTHROPLASTY, RIGHT: ICD-10-CM

## 2022-06-27 DIAGNOSIS — G89.29 CHRONIC RIGHT HIP PAIN: Primary | ICD-10-CM

## 2022-06-27 PROCEDURE — 97110 THERAPEUTIC EXERCISES: CPT | Performed by: PHYSICAL MEDICINE & REHABILITATION

## 2022-06-27 NOTE — PROGRESS NOTES
Daily Note     Today's date: 2022  Patient name: Mirta Garcia  : 1980  MRN: [id number]  Referring provider: Sanford Kaplan MD  Dx:   Encounter Diagnosis     ICD-10-CM    1  Chronic right hip pain  M25 551     G89 29    2  H/O total hip arthroplasty, right  Z96 641    3  Gait abnormality  R26 9                    Subjective: Pt notes she f/u with the surgeon; he was pleased with her progress per pt; he had no questions/concerns per pt; RTD in August  Pt notes she walked with [facility] all weekend  Notes someone told her her her leg is "turning in" and she is "walking funny"  Notes she feels "wobbly" and that her R LE is longer than her LLE  No new sx/complaints  Objective: See treatment diary below      Assessment: Tolerated treatment well  Pt arrived to clinic with [facility]; initially rigid R LE with swing; improved with cues to increase R knee flexion with swing; good gait stability noted with SPC  Does appear to demonstrate a LLD in standing with R LE> LLE; keeps R knee bent in standing to maintain level spine/pelvis; notes pain in R hip when she attempts to extend R knee all the way in standing; will monitor  Cont with weakness R hip abd; unable to perform SL hip abd with pain  Tolerated hooklying hip abd with blue tband without incident  Improved R hip and quad strength/control noted with SLR flexion supine  No complaints after tx  Patient demonstrated fatigue post treatment and would benefit from continued PT to address cont strength and mm endurance deficits R hip/knee that cont to impair her gait and mobility  Plan: Continue per plan of care  Progress treatment as tolerated         Precautions: hx depression, High sx irritability   R TARAN       Re-eval Date:     Date       Visit Count 8/10 9      FOTO        Pain In 3-4/10 3-4/10      Pain Out 2-3/10 5-6/10          Manuals       R hip PROM (within total hip precautions) to tolerance  GENTLE R hip PROM flexion 90*, abduction to tolerance, gentle hip ER to tolerance  5' supine  Motion check       R hip flexor /quad stretch, R HS stretch, R calf stretch                         Neuro Re-Ed        Romberg    Tandem        Perturbation training        Dynamic balance training                                         Ther Ex        Nustep vs [address] [facility] as bisi L3 10'  L 3 10'       Heel slides            GS      QS     Supine no rolls  20-30x 5"       Hip add sets      Hip abd hooklying  Supine SLR flexion AROM   10x  ABD unable     Clamshell   Side lying R  10-15x 0#  Supine SLR flexion AROM   20x  ABD unable    Clamshell   Side lying R  3x10 0#        APs       TA hooklying  Bridges 2x10/5" Bridges 3x10/5"      Hip abd hooklying  Blue  3x10       SAQ          Standing TR/HR LAQ 3x10/5" 1 1/2#          3x10 HR             30x HR       Standing HS curl    Standing hip 3 way         Mini squat      Step up  2x10/ 5" cues  3x10/5" cues       4in step  2x10 R       Ther Activity THR precautions reviewed with pt/family with bed mobility, gait, and transfers                        Gait Training [facility]   T/o clinic  Cues for sequencing with SPC on L side; cues for increasing R hip/knee flexion with swing; CGA- close S  10' total                        Modalities                            6/6 - HEP was issued and reviewed this date for above noted exercises  Pt demonstrated understanding without incident and without questions/concerns  Will continue to update upcoming

## 2022-06-30 ENCOUNTER — OFFICE VISIT (OUTPATIENT)
Dept: PHYSICAL THERAPY | Facility: CLINIC | Age: 42
End: 2022-06-30
Payer: COMMERCIAL

## 2022-06-30 DIAGNOSIS — G89.29 CHRONIC RIGHT HIP PAIN: Primary | ICD-10-CM

## 2022-06-30 DIAGNOSIS — R26.9 GAIT ABNORMALITY: ICD-10-CM

## 2022-06-30 DIAGNOSIS — M25.551 CHRONIC RIGHT HIP PAIN: Primary | ICD-10-CM

## 2022-06-30 DIAGNOSIS — Z96.641 H/O TOTAL HIP ARTHROPLASTY, RIGHT: ICD-10-CM

## 2022-06-30 PROCEDURE — 97110 THERAPEUTIC EXERCISES: CPT | Performed by: PHYSICAL MEDICINE & REHABILITATION

## 2022-06-30 NOTE — PROGRESS NOTES
Daily Note     Today's date: 2022  Patient name: Rd Marroquin  : 1980  MRN: [id number]  Referring provider: Priscilla Combs MD  Dx: No diagnosis found  Subjective: No new sx/complaints  Notes she has been walking only with the [facility] now; gait is gradually improving  Still feels her R LE is longer than her L that still contributes to her difficulty walking at this time; feels she has to keep her R knee bent  Remains compliant with HEP  Notes she was told she could drive however she feels she cannot at this time as she still has slower reaction time with moving her R LE with pain  Objective: See treatment diary below      Assessment: Tolerated treatment well  Added standing hip abd with resistance due to cont'd weakness with R hip abd; tolerated well without incident  Improved AROM with improved mm endurance and strength noted with R clamshell this date  Improved gait with improved grant and improved R hip/knee flex with swing with SPC  Progressing well with PREs overall  Continues to be apprehensive about her progress and cont to note pain in R hip that has been "different than the pain before surgery" which limits her at times with activity tolerance   Overall feels she is making slow progress  No complaints after tx  HEP reviewed  Patient demonstrated fatigue post treatment and would benefit from continued PT      Plan: Continue per plan of care  Progress treatment as tolerated         Precautions: hx depression, High sx irritability   R TARAN       Re-eval Date:     Date      Visit Count 8/10 9 10     FOTO /6       Pain In 3-4/10 3-4/10 4-5/10     Pain Out 2-3/10 5-6/10 5-6/10         Manuals      R hip PROM (within total hip precautions) to tolerance  GENTLE R hip PROM flexion 90*, abduction to tolerance, gentle hip ER to tolerance  5' supine  Motion check  Motion check      R hip flexor /quad stretch, R HS stretch, R calf stretch Neuro Re-Ed        Romberg    Tandem        Perturbation training        Dynamic balance training                                         Ther Ex        Nustep vs [id number] [facility] as bisi L3 10'  L 3 10'  L4 10'      Heel slides            GS      QS     Supine no rolls  20-30x 5"       Hip add sets      Hip abd hooklying  Supine SLR flexion AROM   10x  ABD unable     Clamshell   Side lying R  10-15x 0#  Supine SLR flexion AROM   20x  ABD unable    Clamshell   Side lying R  3x10 0#   Supine SLR flexion AROM   30x  ABD unable    Clamshell   Side lying R  3x10 0#       APs       TA hooklying  Bridges 2x10/5" Bridges 3x10/5"      Hip abd hooklying  Blue  3x10  Bridges 3x10/5"      Hip abd hooklying  Blue  3x10      SAQ          Standing TR/HR LAQ 3x10/5" 1 1/2#          3x10 HR             30x HR  LAQ 3x10/5" 1 1/2#    HS curl 1 5# 3x10      Standing HS curl    Standing hip 3 way          Standing hip abd 1 5# 3x10      Mini squat      Step up  2x10/ 5" cues  3x10/5" cues       4in step  2x10 R  3x10/5" cues       4in step  3x10 R      Ther Activity THR precautions reviewed with pt/family with bed mobility, gait, and transfers                        Gait Training [facility]   T/o clinic  Cues for sequencing with SPC on L side; cues for increasing R hip/knee flexion with swing; CGA- close S  10' total                        Modalities   CP L hip supine x 10' post tx  No adverse reaction to tx noted                            6/6 - HEP was issued and reviewed this date for above noted exercises  Pt demonstrated understanding without incident and without questions/concerns  Will continue to update upcoming

## 2022-07-05 ENCOUNTER — OFFICE VISIT (OUTPATIENT)
Dept: PHYSICAL THERAPY | Facility: CLINIC | Age: 42
End: 2022-07-05
Payer: COMMERCIAL

## 2022-07-05 DIAGNOSIS — G89.29 CHRONIC RIGHT HIP PAIN: Primary | ICD-10-CM

## 2022-07-05 DIAGNOSIS — M25.551 CHRONIC RIGHT HIP PAIN: Primary | ICD-10-CM

## 2022-07-05 DIAGNOSIS — R26.9 GAIT ABNORMALITY: ICD-10-CM

## 2022-07-05 DIAGNOSIS — Z96.641 H/O TOTAL HIP ARTHROPLASTY, RIGHT: ICD-10-CM

## 2022-07-05 PROCEDURE — 97110 THERAPEUTIC EXERCISES: CPT | Performed by: PHYSICAL MEDICINE & REHABILITATION

## 2022-07-05 PROCEDURE — 97140 MANUAL THERAPY 1/> REGIONS: CPT | Performed by: PHYSICAL MEDICINE & REHABILITATION

## 2022-07-05 NOTE — PROGRESS NOTES
Daily Note     Today's date: 2022  Patient name: Antony Robertson  : 1980  MRN: [id number]  Referring provider: Ange Vega MD  Dx:   Encounter Diagnosis     ICD-10-CM    1  Chronic right hip pain  M25 551     G89 29    2  Gait abnormality  R26 9    3  H/O total hip arthroplasty, right  Z96 641                   Subjective: Pt notes she has more pain today because the "weather is bad"  Notes she had a "rough night"  No change in activity per pt  No new sx/complaints  Notes she is still having a hard time walking; unable to put weight on R LE without UE support of a cane ; notes it "hurts" and still feels like "one leg is longer than the other"  Notes her work has been bugging her about when she is coming back  Most of the pain is near the incision R hip and in R groin  Cont R LBP which she feels is from laying more and differently  Objective: See treatment diary below      Assessment: Tolerated treatment fair  Continues to demonstrate antalgic gait  R knee in slight flexion t/o stance phase; reduced R hip/knee flexion with swing  Cont to rely on SPC  Increased weight with some PREs with mm fatigue however pt denied any increased pain/sx  Improved quad control with SLR flexion supine  Improved AROM with clamshells this date; noted pain in groin towards end of reps; no pain following/at rest  Cont with weakness R hip abd; unable to perform R hip abd in sidelying  Noted general soreness after session  Pt remains apprehensive about returning to work/ability to RTW  Patient demonstrated fatigue post treatment and would benefit from continued PT      Plan: Continue per plan of care  Progress treatment as tolerated         Precautions: hx depression, High sx irritability   R TARAN       Re-eval Date:     Date  7    Visit Count 810 9 10 11    FOTO        Pain In 3-4/10 3-4/10 4-5/10 5-6/10     Pain Out 2-3/10 5-6/10 5-6/10 5-6/10        Manuals     R hip PROM (within total hip precautions) to tolerance  GENTLE R hip PROM flexion 90*, abduction to tolerance, gentle hip ER to tolerance  5' supine  Motion check  Motion check  GENTLE R hip PROM flexion 90*, abduction to tolerance, gentle hip ER to tolerance, R hip flexor/quad stretch   10' supine     R hip flexor /quad stretch, R HS stretch, R calf stretch                         Neuro Re-Ed        Romberg    Tandem        Perturbation training        Dynamic balance training                                         Ther Ex        Nustep vs [facility] as bisi L3 10'  L 3 10'  L4 10'  L4 10'     Heel slides            GS      QS     Supine no rolls  20-30x 5"       Hip add sets      Hip abd hooklying  Supine SLR flexion AROM   10x  ABD unable     Clamshell   Side lying R  10-15x 0#  Supine SLR flexion AROM   20x  ABD unable    Clamshell   Side lying R  3x10 0#   Supine SLR flexion AROM   30x  ABD unable    Clamshell   Side lying R  3x10 0#   Supine SLR flexion   30x  ABD unable      Clamshell   Side lying R  3x10 0#      APs       TA hooklying  Bridges 2x10/5" Bridges 3x10/5"      Hip abd hooklying  Blue  3x10  Bridges 3x10/5"      Hip abd hooklying  Blue  3x10  Bridges 3x10/5"      Hip abd hooklying  Blue  3x10     SAQ          Standing TR/HR LAQ 3x10/5" 1 1/2#          3x10 HR             30x HR  LAQ 3x10/5" 1 1/2#    HS curl 1 5# 3x10  LAQ 3x10/5" 2 5#    HS curl 2 5# 3x10     Standing HS curl    Standing hip 3 way          Standing hip abd 1 5# 3x10        Standing hip abd 2 5# 3x10     Mini squat      Step up  2x10/ 5" cues  3x10/5" cues       4in step  2x10 R  3x10/5" cues       4in step  3x10 R  3x10/5" cues       6in step  3x10 R     Ther Activity THR precautions reviewed with pt/family with bed mobility, gait, and transfers                        Gait Training [facility]   T/o clinic  Cues for sequencing with SPC on L side; cues for increasing R hip/knee flexion with swing; CGA- close S  10' total Modalities   CP L hip supine x 10' post tx  No adverse reaction to tx noted                            6/6 - HEP was issued and reviewed this date for above noted exercises  Pt demonstrated understanding without incident and without questions/concerns  Will continue to update upcoming

## 2022-07-07 ENCOUNTER — OFFICE VISIT (OUTPATIENT)
Dept: PHYSICAL THERAPY | Facility: CLINIC | Age: 42
End: 2022-07-07
Payer: COMMERCIAL

## 2022-07-07 DIAGNOSIS — R26.9 GAIT ABNORMALITY: ICD-10-CM

## 2022-07-07 DIAGNOSIS — Z96.641 H/O TOTAL HIP ARTHROPLASTY, RIGHT: ICD-10-CM

## 2022-07-07 DIAGNOSIS — M25.551 CHRONIC RIGHT HIP PAIN: Primary | ICD-10-CM

## 2022-07-07 DIAGNOSIS — G89.29 CHRONIC RIGHT HIP PAIN: Primary | ICD-10-CM

## 2022-07-07 PROCEDURE — 97110 THERAPEUTIC EXERCISES: CPT

## 2022-07-07 PROCEDURE — 97140 MANUAL THERAPY 1/> REGIONS: CPT

## 2022-07-07 NOTE — PROGRESS NOTES
Daily Note     Today's date: 2022  Patient name: Paras Mar  : 1980  MRN: [id number]  Referring provider: Noé Bass MD  Dx:   Encounter Diagnosis     ICD-10-CM    1  Chronic right hip pain  M25 551     G89 29    2  Gait abnormality  R26 9    3  H/O total hip arthroplasty, right  Z96 641        Start Time: 1430  Stop Time: 1530  Total time in clinic (min): 60 minutes    Subjective: "I feel like I am walking weird and I that I lean "       Objective: See treatment diary below      Assessment: Tolerated treatment well  Pt compensations with hip flexion when stepping up on step 2* strength deficits  Cueing for upright posture during gait with full straight stride on R LE  Pt continues with tenderness around incision  Decreased hip abd strength noted; challenged with clamshells  Will continue to progress as able  Patient demonstrated fatigue post treatment and would benefit from continued PT      Plan: Continue per plan of care  Progress treatment as tolerated         Precautions: hx depression, High sx irritability   R TARAN       Re-eval Date:     Date    Visit Count 8/10 9 10 11 12   FOTO        Pain In 3-4/10 3-4/10 4-5/10 5-6/10  4/10   Pain Out 2-3/10 5-6/10 5-6/10 5-6/10 4/10       Manuals    R hip PROM (within total hip precautions) to tolerance  GENTLE R hip PROM flexion 90*, abduction to tolerance, gentle hip ER to tolerance  5' supine  Motion check  Motion check  GENTLE R hip PROM flexion 90*, abduction to tolerance, gentle hip ER to tolerance, R hip flexor/quad stretch   10' supine  GENTLE R hip PROM flexion 90*, abduction to tolerance, gentle hip ER to tolerance, R hip flexor/quad stretch   10' supine    R hip flexor /quad stretch, R HS stretch, R calf stretch                         Neuro Re-Ed        Romberg    Tandem        Perturbation training        Dynamic balance training                                         Ther Ex        Nustep vs [address] [facility] as bisi L3 10'  L 3 10'  L4 10'  L4 10'  L4 10'    Heel slides            GS      QS     Supine no rolls  20-30x 5"       Hip add sets      Hip abd hooklying  Supine SLR flexion AROM   10x  ABD unable     Clamshell   Side lying R  10-15x 0#  Supine SLR flexion AROM   20x  ABD unable    Clamshell   Side lying R  3x10 0#   Supine SLR flexion AROM   30x  ABD unable    Clamshell   Side lying R  3x10 0#   Supine SLR flexion   30x  ABD unable      Clamshell   Side lying R  3x10 0#   Supine SLR flexion   30x  ABD unable      Clamshell   Side lying R  3x10 0#     APs       TA hooklying  Bridges 2x10/5" Bridges 3x10/5"      Hip abd hooklying  Blue  3x10  Bridges 3x10/5"      Hip abd hooklying  Blue  3x10  Bridges 3x10/5"      Hip abd hooklying  Blue  3x10  Bridges 3x10/5"      Hip abd hooklying  Blue  3x10    SAQ          Standing TR/HR LAQ 3x10/5" 1 1/2#          3x10 HR             30x HR  LAQ 3x10/5" 1 1/2#    HS curl 1 5# 3x10  LAQ 3x10/5" 2 5#    HS curl 2 5# 3x10  LAQ 3x10/5" 2 5#      HS curl 3# 3x10    Standing HS curl    Standing hip 3 way          Standing hip abd 1 5# 3x10        Standing hip abd 2 5# 3x10        Standing hip abd 3# 3x10    Mini squat      Step up  2x10/ 5" cues  3x10/5" cues       4in step  2x10 R  3x10/5" cues       4in step  3x10 R  3x10/5" cues       6in step  3x10 R  3x10/5" cues       6in step  3x10 R    Ther Activity THR precautions reviewed with pt/family with bed mobility, gait, and transfers                        Gait Training [facility]   T/o clinic  Cues for sequencing with SPC on L side; cues for increasing R hip/knee flexion with swing; CGA- close S  10' total                        Modalities   CP L hip supine x 10' post tx  No adverse reaction to tx noted                            6/6 - HEP was issued and reviewed this date for above noted exercises  Pt demonstrated understanding without incident and without questions/concerns  Will continue to update upcoming

## 2022-07-11 ENCOUNTER — APPOINTMENT (OUTPATIENT)
Dept: PHYSICAL THERAPY | Facility: CLINIC | Age: 42
End: 2022-07-11
Payer: COMMERCIAL

## 2022-07-14 ENCOUNTER — OFFICE VISIT (OUTPATIENT)
Dept: PHYSICAL THERAPY | Facility: CLINIC | Age: 42
End: 2022-07-14
Payer: COMMERCIAL

## 2022-07-14 DIAGNOSIS — R26.9 GAIT ABNORMALITY: ICD-10-CM

## 2022-07-14 DIAGNOSIS — M25.551 CHRONIC RIGHT HIP PAIN: Primary | ICD-10-CM

## 2022-07-14 DIAGNOSIS — Z96.641 H/O TOTAL HIP ARTHROPLASTY, RIGHT: ICD-10-CM

## 2022-07-14 DIAGNOSIS — G89.29 CHRONIC RIGHT HIP PAIN: Primary | ICD-10-CM

## 2022-07-14 PROCEDURE — 97140 MANUAL THERAPY 1/> REGIONS: CPT

## 2022-07-14 PROCEDURE — 97112 NEUROMUSCULAR REEDUCATION: CPT

## 2022-07-14 PROCEDURE — 97110 THERAPEUTIC EXERCISES: CPT

## 2022-07-14 NOTE — PROGRESS NOTES
Daily Note     Today's date: 2022  Patient name: Jarocho Prado  : 1980  MRN: [id number]  Referring provider: Martin Carrillo MD  Dx:   Encounter Diagnosis     ICD-10-CM    1  Chronic right hip pain  M25 551     G89 29    2  Gait abnormality  R26 9    3  H/O total hip arthroplasty, right  Z96 641        Start Time: 1300  Stop Time: 1400  Total time in clinic (min): 60 minutes    Subjective: "I am trying to walk without the cane but everyone is telling me I am walking wrong "      Objective: See treatment diary below      Assessment: Tolerated treatment well  Reviewed exercises and proper bending to obey precautions  Pt with tendency to avoid full WB through R LE and lean away from surgical side  Slow improvement with strength  Able to complete SL hip abd without A  Will continue to progress as able to address deficits  Patient demonstrated fatigue post treatment and would benefit from continued PT      Plan: Continue per plan of care  Progress treatment as tolerated         Precautions: hx depression, High sx irritability   R TARAN       Re-eval Date:     Date        Visit Count 13/20       FOTO        Pain In 2-3/10       Pain Out 2-3/10           Manuals        R hip PROM (within total hip precautions) to tolerance  GENTLE R hip PROM flexion 90*, abduction to tolerance, gentle hip ER to tolerance  10'supine        R hip flexor /quad stretch, R HS stretch, R calf stretch                         Neuro Re-Ed        Romberg    Tandem        Perturbation training        Dynamic balance training                                         Ther Ex                Heel slides            GS      QS            Hip add sets      Hip abd hooklying  Supine SLR flexion 1# 2x10     SL abd  2x10 pillow between legs    Clamshell   Side lying R  2x10 0#        APs       TA hooklying  Bridges with black tband 2x10/5"       SAQ          Standing TR/HR LAQ 3x10/5" /#        HS curl 3# 3x10       3x10 HR       Standing HS curl    Standing hip 3 way        Standing hip abd 3# 3x10       Mini squat      Step up  3x10/5" cues       6in step  3x10 R        Ther Activity THR precautions reviewed with pt/family with bed mobility, gait, and transfers                        Gait Training [facility]   T/o clinic  Cues for sequencing with SPC on L side; cues for increasing R hip/knee flexion with swing; CGA- close S  10' total                        Modalities                            6/6 - HEP was issued and reviewed this date for above noted exercises  Pt demonstrated understanding without incident and without questions/concerns  Will continue to update upcoming

## 2022-07-18 ENCOUNTER — OFFICE VISIT (OUTPATIENT)
Dept: PHYSICAL THERAPY | Facility: CLINIC | Age: 42
End: 2022-07-18
Payer: COMMERCIAL

## 2022-07-18 DIAGNOSIS — M25.551 CHRONIC RIGHT HIP PAIN: Primary | ICD-10-CM

## 2022-07-18 DIAGNOSIS — Z96.641 H/O TOTAL HIP ARTHROPLASTY, RIGHT: ICD-10-CM

## 2022-07-18 DIAGNOSIS — G89.29 CHRONIC RIGHT HIP PAIN: Primary | ICD-10-CM

## 2022-07-18 PROCEDURE — 97110 THERAPEUTIC EXERCISES: CPT | Performed by: PHYSICAL MEDICINE & REHABILITATION

## 2022-07-18 NOTE — LETTER
[id number]    MD Cherelle Weiss [address] [place] [address]    Patient: Rashmi Farmer   YOB: 1980   Date of Visit: 2022     Encounter Diagnosis     ICD-10-CM    1  Chronic right hip pain  M25 551     G89 29    2  H/O total hip arthroplasty, right  F17 [id number]        Dear Dr Sales Lat: Thank you for your recent referral of Rashmi Farmer  Please review the attached evaluation summary from [facility] recent visit  Please verify that you agree with the plan of care by signing the attached order  If you have any questions or concerns, please do not hesitate to call  I sincerely appreciate the opportunity to share in the care of one of your patients and hope to have another opportunity to work with you in the near future  Sincerely,    Reina Rebolledo, PT      Referring Provider:      I certify that I have read the below Plan of Care and certify the need for these services furnished under this plan of treatment while under my care  MD Cherelle Weiss [address] [place] [address]  Via Fax: [phone]          PT Re-Evaluation     Today's date: 2022  Patient name: Rashmi Farmer  : 1980  MRN: [id number]  Referring provider: Rocío Jean Baptiste MD  Dx:   Encounter Diagnosis     ICD-10-CM    1  Chronic right hip pain  M25 551     G89 29    2  H/O total hip arthroplasty, right  Z96 641                   Assessment  Assessment details: Gibson Contreras has been compliant with attending PT and completing home exercise program since initial eval and reports overall 60-70% improvement in pain/sx and function since start of care   Gibson Contreras reports and demonstrates decreased pain, increased strength, increased ROM, improved flexibility, improved balance and improved overall level of function since initial eval, but is still limited compared to prior level of function   She continues to demonstrate weakness of R glutes and R thigh leading to cont'd mobility difficulties such as difficulty ascending/descending stairs  She continues with tightness of R hip  flexors and adductors  She continues with abnormal gait pattern and contd  reliance on AD  Rupesh Holley continues with above listed impairments and would benefit from additional skilled PT to address these deficits to return to prior level of function  Impairments: abnormal gait, abnormal or restricted ROM, abnormal movement, activity intolerance, impaired physical strength and pain with function    Symptom irritability: moderateUnderstanding of Dx/Px/POC: good   Prognosis: fair    Goals      STGs to be achieved in 4-6 weeks:    1  Pt will report reduced R hip pain levels "at worst" by at least 2 points (0-10 scale) in order to allow improved tolerance for functional mobility and reduced reliance on medication or modalities for pain relief  - met   2  Pt will demonstrate improved AROM of R hip grossly by at least 10-15* (as allowable per hip precautions) in order to reduce pt difficulty with gait and transfers and restore normal joint mobility  -met   3  Pt will demonstrate improved strength of R hip and knee grossly by at least 1/2-1 MMT in order to improve weight bearing joint stability and allow for restoration of normal joint mechanics to reduce pain and improve function  - met  4  Pt will demonstrate ability to safely ambulate at least 100ft without AD with minimized gait deviations, independently, and without evidence of instability  - progressing       LTGs to be achieved in 8-12 weeks:    1  Pt will be independent with HEP, demonstrating proper technique with exercises and understanding of self progression of program without need for cueing or assistance  - progressing  2  Pt will report minimized pain levels with at least 80% reduction in pain since [facility]  - progressing  3  Pt will demonstrate WFL AROM and strength of R hip/LE without increase in sx  - progressing  4  Pt will demonstrate normalized gait for unlimited/self selected distances without deviations or need for assistive device or external support  - progressing  5  Pt will report return to work and ADLs without limitations  - progressing  6  FOTO will reflect score at discharge that is greater than or equal to predicted level  - progressing      Plan  Plan details: RE to be completed post TARAN   Patient would benefit from: skilled physical therapy  Planned modality interventions: cryotherapy and thermotherapy: hydrocollator packs  Planned therapy interventions: balance, manual therapy, motor coordination training, neuromuscular re-education, patient education, self care, strengthening, stretching, therapeutic activities, therapeutic exercise, home exercise program, gait training and functional ROM exercises  Frequency: 2x week  Duration in visits: 12  Duration in weeks: 6  Plan of Care beginning date: 7/18/2022  Plan of Care expiration date: 8/29/2022  Treatment plan discussed with: patient        Subjective Evaluation    History of Present Illness  Mechanism of injury: Pt notes overall she feels she is improving since [facility] with PT  Notes overall about 60-70% Improvement in pain/sx and function and return to PLOF since [facility]  She reports overall reduced pain, improved strength, improved flexibility, and improving gait and mobility  She notes she feels her hip/LE is getting stronger  Notes she has been using her SPC less and less; only really uses it outdoors or on the stairs at home  Notes improving stair mobility but notes cont'd weakness in her R LE that prevents her from going step over step  Notes she still has difficulty bending over or sitting long periods with with R L/S pain  Notes she still feels her gait pattern is "off" with walking, especially without AD  RTD in August  Has not yet RTW  Not driving yet   Notes cont'd pain R groin, lateral hip, buttocks/L/S; can radiate into L thigh but not "that sharp shooting pain to the knee and into the lower leg like before"  Notes overall her hip is improved post surgery  Recurrent probem    Quality of life: fair    Pain  Current pain ratin  At best pain ratin  At worst pain ratin  Location: R groin, R hip, R L/S , R glute, R thigh to knee only   Quality: dull ache and sharp  Relieving factors: ice, rest and change in position  Progression: improved    Social Support  Steps to enter house: yes  Stairs in house: yes   Lives in: multiple-level home  Lives with: spouse    Employment status: working  Hand dominance: right      Diagnostic Tests  X-ray: abnormal  Treatments  Previous treatment: injection treatment and medication  Current treatment: physical therapy  Current treatment comments: TARAN upcoming  Patient Goals  Patient goals for therapy: decreased pain, increased motion, increased strength, independence with ADLs/IADLs, return to work and return to sport/leisure activities          Objective     Neurological Testing     Sensation     Hip   Left Hip   Intact: light touch    Right Hip   Intact: light touch    Active Range of Motion   Left Hip   Normal active range of motion    Right Hip   Flexion: Right hip active flexion: 90*- hip precautions in place  with pain  Extension: Right hip active extension: neutral- 5* with pain  Abduction: Right hip active abduction: ~20-25* with pain  Adduction: Right hip active adduction: neutral    External rotation (90/90): Right hip active external rotation 90/90: ~50% , stiff  Internal rotation (90/90): Right hip active internal rotation 90/90: NT    Left Knee   Normal active range of motion    Right Knee   Normal active range of motion    Additional Active Range of Motion Details  R THR precautions in place     R knee ROM WNL (130*+ flexion, 0* ext)- no pain/sx   R ankle WNL/WFL      Passive Range of Motion     Right Hip   Flexion: 85 degrees with pain  External rotation (90/90):  Right hip passive external rotation 90/90: ~20* with pain  Internal rotation (90/90):  Right hip passive internal rotation 90/90: 0* with pain    Additional Passive Range of Motion Details  Painful and limited R hip PROM all planes- improved, no longer has painful PROM   Empty end feel; limited by pain, apprehension, and mm guarding- resolved, normal end feels as appropriate with THR precautions in place   Will cont to assess upcoming      Strength/Myotome Testing     Left Hip   Planes of Motion   Flexion: 4+  Abduction: 4+  Adduction: 4+    Right Hip   Planes of Motion   Flexion: 4- (discomfort R groin/L/S/hip )  Abduction: 4- (discomfort)  Adduction: 4- (discomfort )  External rotation: 4-    Left Knee   Flexion: 4+  Extension: 4+  Quadriceps contraction: good    Right Knee   Flexion: 4  Extension: 4- (pain R hip/thigh/glute- discomfort only)  Quadriceps contraction: good    Left Ankle/Foot   Dorsiflexion: 5  Plantar flexion: 5    Right Ankle/Foot   Dorsiflexion: 4+  Plantar flexion: 4+    Additional Strength Details  Improving strength all planes R hip       Ambulation     Quality of Movement During Gait     Additional Quality of Movement During Gait Details  Ambulates with SPC  Antalgic gait  Poor R stance time- improved however stance time remains reduced vs L   Reduced R hip and knee flexion with swing - improved   Reduced grant- improving   Increased lateral flexion with R stance- reduced   Able to ambulate without SPC; increased lateral flexion with R stance and reduced R stance time ; will cont to assess    Stairs- step to step with reliance on LLE and Hrs; painful - improving, able to take some steps step over step, limited by fatigue and weakness L LE > pain             Precautions: hx depression, High sx irritability   R TARAN 5/31      Re-eval Date: 8/29    Date 7/14 7/18      Visit Count 13/20 14/20      FOTO        Pain In 2-3/10 2-3/10      Pain Out 2-3/10 2-3/10          Manuals 7/14 7/18      R hip PROM (within total hip precautions) to tolerance  GENTLE R hip PROM flexion 90*, abduction to tolerance, gentle hip ER to tolerance  10'supine  resume      R hip flexor /quad stretch, R HS stretch, R calf stretch                         Neuro Re-Ed        Romberg    Tandem        Perturbation training        Dynamic balance training                                         Ther Ex                Heel slides            GS      QS            Hip add sets      Hip abd hooklying  Supine SLR flexion 1# 2x10     SL abd  2x10 pillow between legs    Clamshell   Side lying R  2x10 0#  Supine SLR flexion 0# 3x10     SL abd         Clamshell   Side lying R  3x10 0#          Resume      APs       TA hooklying  Bridges with black tband 2x10/5" Bridges with black tband 3x10/5"      SAQ          Standing TR/HR LAQ 3x10/5" 21/2#        HS curl 3# 3x10       3x10 HR LAQ 3x10/5" 3#        HS curl 3# 3x10  Knee ext mach          HS curl   Machine      Standing HS curl    Standing hip 3 way        Standing hip abd 3# 3x10       Standing hip abd 3# 3x10      Mini squat      Step up  3x10/5" cues       6in step  3x10 R  3x10/5" cues       Bottom step   3x10 R Leg press      Ther Activity THR precautions reviewed with pt/family with bed mobility, gait, and transfers                        Gait Training [facility]   T/o clinic  Cues for sequencing with SPC on L side; cues for increasing R hip/knee flexion with swing; CGA- close S  10' total                        Modalities  deferred                          6/6 - HEP was issued and reviewed this date for above noted exercises  Pt demonstrated understanding without incident and without questions/concerns  Will continue to update upcoming

## 2022-07-18 NOTE — PROGRESS NOTES
PT Re-Evaluation     Today's date: 2022  Patient name: Joni Johnson  : 1980  MRN: [id number]  Referring provider: Kirk Arce MD  Dx:   Encounter Diagnosis     ICD-10-CM    1  Chronic right hip pain  M25 551     G89 29    2  H/O total hip arthroplasty, right  Z96 641                   Assessment  Assessment details: Mauricio Adame has been compliant with attending PT and completing home exercise program since initial eval and reports overall 60-70% improvement in pain/sx and function since start of care   Mauricio Adame reports and demonstrates decreased pain, increased strength, increased ROM, improved flexibility, improved balance and improved overall level of function since initial eval, but is still limited compared to prior level of function  She continues to demonstrate weakness of R glutes and R thigh leading to cont'd mobility difficulties such as difficulty ascending/descending stairs  She continues with tightness of R hip  flexors and adductors  She continues with abnormal gait pattern and contd  reliance on AD  Mauricio Adame continues with above listed impairments and would benefit from additional skilled PT to address these deficits to return to prior level of function  Impairments: abnormal gait, abnormal or restricted ROM, abnormal movement, activity intolerance, impaired physical strength and pain with function    Symptom irritability: moderateUnderstanding of Dx/Px/POC: good   Prognosis: fair    Goals      STGs to be achieved in 4-6 weeks:    1  Pt will report reduced R hip pain levels "at worst" by at least 2 points (0-10 scale) in order to allow improved tolerance for functional mobility and reduced reliance on medication or modalities for pain relief  - met   2  Pt will demonstrate improved AROM of R hip grossly by at least 10-15* (as allowable per hip precautions) in order to reduce pt difficulty with gait and transfers and restore normal joint mobility  -met   3   Pt will demonstrate improved strength of R hip and knee grossly by at least 1/2-1 MMT in order to improve weight bearing joint stability and allow for restoration of normal joint mechanics to reduce pain and improve function  - met  4  Pt will demonstrate ability to safely ambulate at least 100ft without AD with minimized gait deviations, independently, and without evidence of instability  - progressing       LTGs to be achieved in 8-12 weeks:    1  Pt will be independent with HEP, demonstrating proper technique with exercises and understanding of self progression of program without need for cueing or assistance  - progressing  2  Pt will report minimized pain levels with at least 80% reduction in pain since [facility]  - progressing  3  Pt will demonstrate WFL AROM and strength of R hip/LE without increase in sx  - progressing  4  Pt will demonstrate normalized gait for unlimited/self selected distances without deviations or need for assistive device or external support  - progressing  5  Pt will report return to work and ADLs without limitations  - progressing  6  FOTO will reflect score at discharge that is greater than or equal to predicted level    - progressing      Plan  Plan details: RE to be completed post TARAN   Patient would benefit from: skilled physical therapy  Planned modality interventions: cryotherapy and thermotherapy: hydrocollator packs  Planned therapy interventions: balance, manual therapy, motor coordination training, neuromuscular re-education, patient education, self care, strengthening, stretching, therapeutic activities, therapeutic exercise, home exercise program, gait training and functional ROM exercises  Frequency: 2x week  Duration in visits: 12  Duration in weeks: 6  Plan of Care beginning date: 7/18/2022  Plan of Care expiration date: 8/29/2022  Treatment plan discussed with: patient        Subjective Evaluation    History of Present Illness  Mechanism of injury: Pt notes overall she feels she is improving since [facility] with PT  Notes overall about 60-70% Improvement in pain/sx and function and return to PLOF since [facility]  She reports overall reduced pain, improved strength, improved flexibility, and improving gait and mobility  She notes she feels her hip/LE is getting stronger  Notes she has been using her SPC less and less; only really uses it outdoors or on the stairs at home  Notes improving stair mobility but notes cont'd weakness in her R LE that prevents her from going step over step  Notes she still has difficulty bending over or sitting long periods with with R L/S pain  Notes she still feels her gait pattern is "off" with walking, especially without AD  RTD in August  Has not yet RTW  Not driving yet  Notes cont'd pain R groin, lateral hip, buttocks/L/S; can radiate into L thigh but not "that sharp shooting pain to the knee and into the lower leg like before"  Notes overall her hip is improved post surgery  Recurrent probem    Quality of life: fair    Pain  Current pain ratin  At best pain ratin  At worst pain ratin  Location: R groin, R hip, R L/S , R glute, R thigh to knee only   Quality: dull ache and sharp  Relieving factors: ice, rest and change in position  Progression: improved    Social Support  Steps to enter house: yes  Stairs in house: yes   Lives in: multiple-level home  Lives with: spouse    Employment status: working  Hand dominance: right      Diagnostic Tests  X-ray: abnormal  Treatments  Previous treatment: injection treatment and medication  Current treatment: physical therapy  Current treatment comments: TARAN upcoming       Patient Goals  Patient goals for therapy: decreased pain, increased motion, increased strength, independence with ADLs/IADLs, return to work and return to sport/leisure activities          Objective     Neurological Testing     Sensation     Hip   Left Hip   Intact: light touch    Right Hip   Intact: light touch    Active Range of Motion   Left Hip   Normal active range of motion    Right Hip   Flexion: Right hip active flexion: 90*- hip precautions in place  with pain  Extension: Right hip active extension: neutral- 5* with pain  Abduction: Right hip active abduction: ~20-25* with pain  Adduction: Right hip active adduction: neutral    External rotation (90/90): Right hip active external rotation 90/90: ~50% , stiff  Internal rotation (90/90): Right hip active internal rotation 90/90: NT    Left Knee   Normal active range of motion    Right Knee   Normal active range of motion    Additional Active Range of Motion Details  R THR precautions in place     R knee ROM WNL (130*+ flexion, 0* ext)- no pain/sx   R ankle WNL/WFL      Passive Range of Motion     Right Hip   Flexion: 85 degrees with pain  External rotation (90/90): Right hip passive external rotation 90/90: ~20* with pain  Internal rotation (90/90):  Right hip passive internal rotation 90/90: 0* with pain    Additional Passive Range of Motion Details  Painful and limited R hip PROM all planes- improved, no longer has painful PROM   Empty end feel; limited by pain, apprehension, and mm guarding- resolved, normal end feels as appropriate with THR precautions in place   Will cont to assess upcoming      Strength/Myotome Testing     Left Hip   Planes of Motion   Flexion: 4+  Abduction: 4+  Adduction: 4+    Right Hip   Planes of Motion   Flexion: 4- (discomfort R groin/L/S/hip )  Abduction: 4- (discomfort)  Adduction: 4- (discomfort )  External rotation: 4-    Left Knee   Flexion: 4+  Extension: 4+  Quadriceps contraction: good    Right Knee   Flexion: 4  Extension: 4- (pain R hip/thigh/glute- discomfort only)  Quadriceps contraction: good    Left Ankle/Foot   Dorsiflexion: 5  Plantar flexion: 5    Right Ankle/Foot   Dorsiflexion: 4+  Plantar flexion: 4+    Additional Strength Details  Improving strength all planes R hip       Ambulation     Quality of Movement During Gait     Additional Quality of Movement During Gait Details  Ambulates with SPC  Antalgic gait  Poor R stance time- improved however stance time remains reduced vs L   Reduced R hip and knee flexion with swing - improved   Reduced grant- improving   Increased lateral flexion with R stance- reduced   Able to ambulate without SPC; increased lateral flexion with R stance and reduced R stance time ; will cont to assess    Stairs- step to step with reliance on LLE and Hrs; painful - improving, able to take some steps step over step, limited by fatigue and weakness L LE > pain             Precautions: hx depression, High sx irritability   R TARAN 5/31      Re-eval Date: 8/29    Date 7/14 7/18      Visit Count 13/20 14/20      FOTO        Pain In 2-3/10 2-3/10      Pain Out 2-3/10 2-3/10          Manuals 7/14 7/18      R hip PROM (within total hip precautions) to tolerance  GENTLE R hip PROM flexion 90*, abduction to tolerance, gentle hip ER to tolerance  10'supine  resume      R hip flexor /quad stretch, R HS stretch, R calf stretch                         Neuro Re-Ed        Romberg    Tandem        Perturbation training        Dynamic balance training                                         Ther Ex                Heel slides            GS      QS            Hip add sets      Hip abd hooklying  Supine SLR flexion 1# 2x10     SL abd  2x10 pillow between legs    Clamshell   Side lying R  2x10 0#  Supine SLR flexion 0# 3x10     SL abd         Clamshell   Side lying R  3x10 0#          Resume      APs       TA hooklying  Bridges with black tband 2x10/5" Bridges with black tband 3x10/5"      SAQ          Standing TR/HR LAQ 3x10/5" 21/2#        HS curl 3# 3x10       3x10 HR LAQ 3x10/5" 3#        HS curl 3# 3x10  Knee ext mach          HS curl   Machine      Standing HS curl    Standing hip 3 way        Standing hip abd 3# 3x10       Standing hip abd 3# 3x10      Mini squat      Step up  3x10/5" cues       6in step  3x10 R  3x10/5" cues       Bottom step   3x10 R Leg press Ther Activity THR precautions reviewed with pt/family with bed mobility, gait, and transfers                        Gait Training [facility]   T/o clinic  Cues for sequencing with SPC on L side; cues for increasing R hip/knee flexion with swing; CGA- close S  10' total                        Modalities  deferred                          6/6 - HEP was issued and reviewed this date for above noted exercises  Pt demonstrated understanding without incident and without questions/concerns  Will continue to update upcoming

## 2022-07-21 ENCOUNTER — OFFICE VISIT (OUTPATIENT)
Dept: PHYSICAL THERAPY | Facility: CLINIC | Age: 42
End: 2022-07-21
Payer: COMMERCIAL

## 2022-07-21 DIAGNOSIS — R26.9 GAIT ABNORMALITY: ICD-10-CM

## 2022-07-21 DIAGNOSIS — M25.551 CHRONIC RIGHT HIP PAIN: Primary | ICD-10-CM

## 2022-07-21 DIAGNOSIS — G89.29 CHRONIC RIGHT HIP PAIN: Primary | ICD-10-CM

## 2022-07-21 DIAGNOSIS — Z96.641 H/O TOTAL HIP ARTHROPLASTY, RIGHT: ICD-10-CM

## 2022-07-21 PROCEDURE — 97110 THERAPEUTIC EXERCISES: CPT | Performed by: PHYSICAL MEDICINE & REHABILITATION

## 2022-07-21 NOTE — PROGRESS NOTES
Daily Note     Today's date: 2022  Patient name: Andre Harrison  : 1980  MRN: [id number]  Referring provider: Jeremiah Osorio MD  Dx:   Encounter Diagnosis     ICD-10-CM    1  Chronic right hip pain  M25 551     G89 29    2  H/O total hip arthroplasty, right  Z96 641    3  Gait abnormality  R26 9                   Subjective: Pt notes she is doing better  Walking around more without the [facility]  Still takes the cane with her outdoors or for longer walks "just in case"  No new sx/complaints  Objective: See treatment diary below      Assessment: Tolerated treatment well  Improved gait without SPC  More symmetrical stance time without AD  Reduced lateral flexion with R stance  Pt demonstrating improving R hip strength all planes ;able to add orange tband to clamshells; also able to perform SL hip abd; improved AROM R hip with hip abd SLR and clamshells vs previous sessions  Added leg press , knee ext, and HS curl machine without incident; weakness noted in quadriceps > HS  Reviewed exercises for HEP  Pt is progressing well overall  Continues with strength deficits R hip and quad that cont to cause difficulty with gait/mobility and transfers  Patient demonstrated fatigue post treatment and would benefit from continued PT      Plan: Continue per plan of care  Progress treatment as tolerated         Precautions: hx depression, High sx irritability   R TARAN       Re-eval Date:     Date      Visit Count 13/20 14/20 15/20     FOTO        Pain In 2-3/10 2-3/10 2/10     Pain Out 2-310 2-3/10 2/10         Manuals      R hip PROM (within total hip precautions) to tolerance  GENTLE R hip PROM flexion 90*, abduction to tolerance, gentle hip ER to tolerance  10'supine  resume      R hip flexor /quad stretch, R HS stretch, R calf stretch                         Neuro Re-Ed        Romberg    Tandem        Perturbation training        Dynamic balance training Ther Ex                Heel slides            GS      QS            Hip add sets      Hip abd hooklying  Supine SLR flexion 1# 2x10     SL abd  2x10 pillow between legs    Clamshell   Side lying R  2x10 0#  Supine SLR flexion 0# 3x10     SL abd         Clamshell   Side lying R  3x10 0#  Supine SLR flexion 1# 3x10     SL abd 0 #  2x10        Clamshell   Side lying R  orange  3x10 0#      APs       TA hooklying  Bridges with black tband 2x10/5" Bridges with black tband 3x10/5" HEP     SAQ          Standing TR/HR LAQ 3x10/5" 21/2#        HS curl 3# 3x10       3x10 HR LAQ 3x10/5" 3#        HS curl 3# 3x10  Knee ext mach 11#  2x10     HS curl   Machine   22#  2x10      Standing HS curl    Standing hip 3 way        Standing hip abd 3# 3x10       Standing hip abd 3# 3x10 Leg press   35# 2x10      Mini squat      Step up  3x10/5" cues       6in step  3x10 R  3x10/5" cues       Bottom step   3x10 R 3x10/5"      HEP      Hrs 3x10      Ther Activity THR precautions reviewed with pt/family with bed mobility, gait, and transfers                        Gait Training [facility]   T/o clinic  Cues for sequencing with SPC on L side; cues for increasing R hip/knee flexion with swing; CGA- close S  10' total                        Modalities  deferred deferred                         6/6 - HEP was issued and reviewed this date for above noted exercises  Pt demonstrated understanding without incident and without questions/concerns  Will continue to update upcoming

## 2022-07-25 ENCOUNTER — OFFICE VISIT (OUTPATIENT)
Dept: PHYSICAL THERAPY | Facility: CLINIC | Age: 42
End: 2022-07-25
Payer: COMMERCIAL

## 2022-07-25 DIAGNOSIS — G89.29 CHRONIC RIGHT HIP PAIN: Primary | ICD-10-CM

## 2022-07-25 DIAGNOSIS — M25.551 CHRONIC RIGHT HIP PAIN: Primary | ICD-10-CM

## 2022-07-25 DIAGNOSIS — Z96.641 H/O TOTAL HIP ARTHROPLASTY, RIGHT: ICD-10-CM

## 2022-07-25 DIAGNOSIS — R26.9 GAIT ABNORMALITY: ICD-10-CM

## 2022-07-25 PROCEDURE — 97110 THERAPEUTIC EXERCISES: CPT

## 2022-07-25 PROCEDURE — 97112 NEUROMUSCULAR REEDUCATION: CPT

## 2022-07-25 NOTE — PROGRESS NOTES
Daily Note     Today's date: 2022  Patient name: Joni Johnson  : 1980  MRN: [id number]  Referring provider: Kirk Arce MD  Dx:   Encounter Diagnosis     ICD-10-CM    1  Chronic right hip pain  M25 551     G89 29    2  H/O total hip arthroplasty, right  Z96 641    3  Gait abnormality  R26 9        Start Time: 1420  Stop Time: 1520  Total time in clinic (min): 60 minutes    Subjective: "I decided I am not going to use the cane when walking, only when I do steps because I don't have enough strength to step up through my R LE "      Objective: See treatment diary below      Assessment: Tolerated treatment well  Added side stepping and monster walks with good form and control  Good knee and hip alignment  Able to increase weight on machines  Hip flexion continues to be challenging to complete  Will continue to progress as able to address as able  Patient demonstrated fatigue post treatment and would benefit from continued PT      Plan: Continue per plan of care  Progress treatment as tolerated         Precautions: hx depression, High sx irritability   R TARAN       Re-eval Date:     Date     Visit Count 13/20 14/20 15/20 16/20    FOTO        Pain In 2-3/10 2-3/10 210 1ish    Pain Out 2-310 2-3/10 2/10 1-2        Manuals     R hip PROM (within total hip precautions) to tolerance  GENTLE R hip PROM flexion 90*, abduction to tolerance, gentle hip ER to tolerance  10'supine  resume      R hip flexor /quad stretch, R HS stretch, R calf stretch                         Neuro Re-Ed        Romberg    Tandem        Perturbation training        Dynamic balance training         Side stepping    4x30'    Monster walking    4x30'                    Ther Ex                Heel slides            GS      QS            Hip add sets      Hip abd hooklying  Supine SLR flexion 1# 2x10     SL abd  2x10 pillow between legs    Clamshell   Side lying R  2x10 0#  Supine SLR flexion 0# 3x10     SL abd         Clamshell   Side lying R  3x10 0#  Supine SLR flexion 1# 3x10     SL abd 0 #  2x10        Clamshell   Side lying R  orange  3x10 0#  Supine SLR flexion 2# 3x10     SL abd 0 #  3x10        Clamshell   Side lying R  orange  3x10 0#     APs       TA hooklying  Bridges with black tband 2x10/5" Bridges with black tband 3x10/5" HEP     SAQ          Standing TR/HR LAQ 3x10/5" 21/2#        HS curl 3# 3x10       3x10 HR LAQ 3x10/5" 3#        HS curl 3# 3x10  Knee ext mach 11#  2x10     HS curl   Machine   22#  2x10  Knee ext mach 22#  3x10     HS curl   Machine   33#  3x10     Standing HS curl    Standing hip 3 way        Standing hip abd 3# 3x10       Standing hip abd 3# 3x10 Leg press   35# 2x10  Leg press   60# 3x10     Mini squat      Step up  3x10/5" cues       6in step  3x10 R  3x10/5" cues       Bottom step   3x10 R 3x10/5"      HEP      Hrs 3x10  3x10/3-5"    Ther Activity THR precautions reviewed with pt/family with bed mobility, gait, and transfers                        Gait Training [facility]   T/o clinic  Cues for sequencing with SPC on L side; cues for increasing R hip/knee flexion with swing; CGA- close S  10' total                        Modalities  deferred deferred                         6/6 - HEP was issued and reviewed this date for above noted exercises  Pt demonstrated understanding without incident and without questions/concerns  Will continue to update upcoming

## 2022-07-28 ENCOUNTER — OFFICE VISIT (OUTPATIENT)
Dept: PHYSICAL THERAPY | Facility: CLINIC | Age: 42
End: 2022-07-28
Payer: COMMERCIAL

## 2022-07-28 DIAGNOSIS — Z96.641 H/O TOTAL HIP ARTHROPLASTY, RIGHT: Primary | ICD-10-CM

## 2022-07-28 PROCEDURE — 97110 THERAPEUTIC EXERCISES: CPT

## 2022-07-28 PROCEDURE — 97112 NEUROMUSCULAR REEDUCATION: CPT

## 2022-07-28 NOTE — PROGRESS NOTES
Daily Note     Today's date: 2022  Patient name: Pretty Bridges  : 1980  MRN: [id number]  Referring provider: Kenna James MD  Dx:   Encounter Diagnosis     ICD-10-CM    1  H/O total hip arthroplasty, right  Z96 641        Start Time:   Stop Time: 89  Total time in clinic (min): 55 minutes    Subjective: "I am stiff and sore today  I walked 2 miles yesterday up hill  I am suppose to RTW 22 "      Objective: See treatment diary below      Assessment: Tolerated treatment well  Pt able to increase weight on hamcurls  Added resistance to walking activities  Strength improving however weakness with quick muscle fatigue continues  Cueing for foot and knee alignment  Will continue to monitor and progress as able  Patient demonstrated fatigue post treatment and would benefit from continued PT      Plan: Continue per plan of care  Progress treatment as tolerated         Precautions: hx depression, High sx irritability   R TARAN       Re-eval Date:     Date    Visit Count 13/20 14/20 15/20 16/20 17/20   FOTO        Pain In 2-310 2-310 210 1ish 1-2   Pain Out 2-3/10 2-310 210 1-2        Manuals    R hip PROM (within total hip precautions) to tolerance  GENTLE R hip PROM flexion 90*, abduction to tolerance, gentle hip ER to tolerance  10'supine  resume      R hip flexor /quad stretch, R HS stretch, R calf stretch                         Neuro Re-Ed        Romberg    Tandem        Perturbation training        Dynamic balance training         Side stepping    4x30'    Monster walking    4x30'                    Ther Ex                Heel slides            GS      QS            Hip add sets      Hip abd hooklying  Supine SLR flexion 1# 2x10     SL abd  2x10 pillow between legs    Clamshell   Side lying R  2x10 0#  Supine SLR flexion 0# 3x10     SL abd         Clamshell   Side lying R  3x10 0#  Supine SLR flexion 1# 3x10     SL abd 0 #  2x10        Clamshell   Side lying R  orange  3x10 0#  Supine SLR flexion 2# 3x10     SL abd 0 #  3x10        Clamshell   Side lying R  orange  3x10 0#     APs       TA hooklying  Bridges with black tband 2x10/5" Bridges with black tband 3x10/5" HEP     SAQ          Standing TR/HR LAQ 3x10/5" 21/2#        HS curl 3# 3x10       3x10 HR LAQ 3x10/5" 3#        HS curl 3# 3x10  Knee ext mach 11#  2x10     HS curl   Machine   22#  2x10  Knee ext mach 22#  3x10     HS curl   Machine   33#  3x10  Knee ext mach 33#  3x10     HS curl   Machine   44#  3x10    Standing HS curl    Standing hip 3 way        Standing hip abd 3# 3x10       Standing hip abd 3# 3x10 Leg press   35# 2x10  Leg press   60# 3x10  Leg press   75# 3x10   Mini squat      Step up  3x10/5" cues       6in step  3x10 R  3x10/5" cues       Bottom step   3x10 R 3x10/5"      HEP      Hrs 3x10  3x10/3-5"    Ther Activity THR precautions reviewed with pt/family with bed mobility, gait, and transfers                        Gait Training [facility]   T/o clinic  Cues for sequencing with SPC on L side; cues for increasing R hip/knee flexion with swing; CGA- close S  10' total                        Modalities  deferred deferred                         6/6 - HEP was issued and reviewed this date for above noted exercises  Pt demonstrated understanding without incident and without questions/concerns  Will continue to update upcoming

## 2022-08-01 ENCOUNTER — OFFICE VISIT (OUTPATIENT)
Dept: PHYSICAL THERAPY | Facility: CLINIC | Age: 42
End: 2022-08-01
Payer: COMMERCIAL

## 2022-08-01 DIAGNOSIS — Z96.641 H/O TOTAL HIP ARTHROPLASTY, RIGHT: Primary | ICD-10-CM

## 2022-08-01 DIAGNOSIS — G89.29 CHRONIC RIGHT HIP PAIN: ICD-10-CM

## 2022-08-01 DIAGNOSIS — M25.551 CHRONIC RIGHT HIP PAIN: ICD-10-CM

## 2022-08-01 DIAGNOSIS — R26.9 GAIT ABNORMALITY: ICD-10-CM

## 2022-08-01 PROCEDURE — 97110 THERAPEUTIC EXERCISES: CPT | Performed by: PHYSICAL MEDICINE & REHABILITATION

## 2022-08-01 PROCEDURE — 97112 NEUROMUSCULAR REEDUCATION: CPT | Performed by: PHYSICAL MEDICINE & REHABILITATION

## 2022-08-01 NOTE — PROGRESS NOTES
Daily Note     Today's date: 2022  Patient name: Lilliam Montilla  : 1980  MRN: [id number]  Referring provider: Markie Blancas MD  Dx:   Encounter Diagnosis     ICD-10-CM    1  H/O total hip arthroplasty, right  Z96 641    2  Chronic right hip pain  M25 551     G89 29    3  Gait abnormality  R26 9                   Subjective: Pt notes she returned to work today  Was able to sit all day  Her hip is a little sore from this increased sitting  No new sx/complaints  RTD on the ; still feels she may benefit from a heel lift/shoe lift 2* LLD  Objective: See treatment diary below      Assessment: Tolerated treatment well  Increased weight/reps with many exercises without incident; indicated mm fatigue only; demonstrates improving R hip and thigh strength and mm endurance  Improved gait noted with minimized deviations without AD; no imbalance; continues with reduced stance time RLE vs L  Noted mm fatigue only after tx  Pt has limited visits/year for PT from insurance; plans to d/c end of this week  No complaints after tx  Patient demonstrated fatigue post treatment and would benefit from continued PT      Plan: Continue per plan of care  Progress treatment as tolerated  Plan to D/c to HEP per pt request 2* insurance restrictions end of the week        Precautions: hx depression, High sx irritability   R TARAN       Re-eval Date:     Date        Visit Count 18/20       FOTO        Pain In 10       Pain Out 3/10           Manuals        R hip PROM (within total hip precautions) to tolerance         R hip flexor /quad stretch, R HS stretch, R calf stretch                         Neuro Re-Ed        Romberg    Tandem        Perturbation training        Dynamic balance training         Side stepping 4x30'ea       Monster walking 4x30'ea                       Ther Ex         Nustep L 5 10'        Heel slides            GS      QS            Hip add sets      Hip abd hooklying  Supine SLR flexion 2# 3x10     SL abd 2#  3x10        Clamshell   Side lying R  Blue  2x10 0#        APs       TA hooklying         SAQ          Standing TR/HR Knee ext mach 44#  4x10     HS curl   Machine   44#  4x10              Standing HS curl    Standing hip 3 way  Leg press   75# 4x10                 Mini squat      Step up  1x10  Reviewed HEP       Ther Activity                        Gait Training                        Modalities                            6/6 - HEP was issued and reviewed this date for above noted exercises  Pt demonstrated understanding without incident and without questions/concerns  Will continue to update upcoming

## 2022-08-03 ENCOUNTER — APPOINTMENT (OUTPATIENT)
Dept: LAB | Facility: MEDICAL CENTER | Age: 42
End: 2022-08-03
Payer: COMMERCIAL

## 2022-08-03 DIAGNOSIS — M25.551 RIGHT HIP PAIN: ICD-10-CM

## 2022-08-03 DIAGNOSIS — F41.9 ANXIETY: ICD-10-CM

## 2022-08-03 LAB
ERYTHROCYTE [SEDIMENTATION RATE] IN BLOOD: 19 MM/HOUR (ref 0–19)
T4 FREE SERPL-MCNC: 1.01 NG/DL (ref 0.76–1.46)
TSH SERPL DL<=0.05 MIU/L-ACNC: 0.73 UIU/ML (ref 0.45–4.5)

## 2022-08-03 PROCEDURE — 84443 ASSAY THYROID STIM HORMONE: CPT

## 2022-08-03 PROCEDURE — 36415 COLL VENOUS BLD VENIPUNCTURE: CPT

## 2022-08-03 PROCEDURE — 84439 ASSAY OF FREE THYROXINE: CPT

## 2022-08-03 PROCEDURE — 85652 RBC SED RATE AUTOMATED: CPT

## 2022-08-04 ENCOUNTER — APPOINTMENT (OUTPATIENT)
Dept: PHYSICAL THERAPY | Facility: CLINIC | Age: 42
End: 2022-08-04
Payer: COMMERCIAL

## 2022-08-11 ENCOUNTER — OFFICE VISIT (OUTPATIENT)
Dept: PHYSICAL THERAPY | Facility: CLINIC | Age: 42
End: 2022-08-11
Payer: COMMERCIAL

## 2022-08-11 DIAGNOSIS — M25.551 CHRONIC RIGHT HIP PAIN: ICD-10-CM

## 2022-08-11 DIAGNOSIS — Z96.641 H/O TOTAL HIP ARTHROPLASTY, RIGHT: Primary | ICD-10-CM

## 2022-08-11 DIAGNOSIS — R26.9 GAIT ABNORMALITY: ICD-10-CM

## 2022-08-11 DIAGNOSIS — G89.29 CHRONIC RIGHT HIP PAIN: ICD-10-CM

## 2022-08-11 PROCEDURE — 97110 THERAPEUTIC EXERCISES: CPT | Performed by: PHYSICAL MEDICINE & REHABILITATION

## 2022-08-11 PROCEDURE — 97112 NEUROMUSCULAR REEDUCATION: CPT | Performed by: PHYSICAL MEDICINE & REHABILITATION

## 2022-08-11 NOTE — PROGRESS NOTES
Daily Note     Today's date: 2022  Patient name: Kamilla Bee  : 1980  MRN: [id number]  Referring provider: Maximilian Waldron MD  Dx:   Encounter Diagnosis     ICD-10-CM    1  H/O total hip arthroplasty, right  Z96 641    2  Chronic right hip pain  M25 551     G89 29    3  Gait abnormality  R26 9                   Subjective: Pt notes she pushed herself too hard last week with her exercises; notes she was doing a lot of squats at home and at work; had a lot of mm soreness in her legs; started taking the Celebrex again  Feeling better now  No new sx/complaints  Objective: See treatment diary below      Assessment: Tolerated treatment well  Pt able to increase weights with leg press and SLRs without incident demonstrating improvements in LE strength  Reduced fatigue noted with side steps and monster walks with reduced rest periods taken, demonstrating improvement in LE endurance as well  No pain noted with/following tx  Improved hip mobility/ROM noted all planes; able to touch ankles/lower legs with forward bend in standing without pain/sx  Improved gait; no longer uses AD; no evidence for imbalance  RTW  Pt notes I with HEP  She notes she has limited PT visits per her insurance  She notes she feels she can d/c to HEP  Plan to trial HEP; pt to contact PT with any questions/concerns or needs post D/c  Plan: Hold PT vs D/c   Pt to cont with HEP     Precautions: hx depression, High sx irritability   R TARAN       Re-eval Date:     Date       Visit Count 1820 1920      FOTO        Pain In 1/10 0/10      Pain Out 3/10 0/10          Manuals       R hip PROM (within total hip precautions) to tolerance         R hip flexor /quad stretch, R HS stretch, R calf stretch                         Neuro Re-Ed        Romberg    Tandem        Perturbation training        Dynamic balance training         Side stepping 4x30'ea 4x35'ea      Monster walking 4x30'ea 4x35'ea Ther Ex         Nustep L 5 10'  Nustep L 5 10'       Heel slides            GS      QS            Hip add sets      Hip abd hooklying  Supine SLR flexion 2# 3x10     SL abd 2#  3x10        Clamshell   Side lying R  Blue  2x10 0#  Supine SLR flexion 2 5# 2x10     SL abd 2 5#  2x10        Clamshell   Side lying R  Blue  3x10 0#       APs       TA hooklying         SAQ          Standing TR/HR Knee ext mach 44#  4x10     HS curl   Machine   44#  4x10        Knee ext mach 44#  4x10     HS curl   Machine   44#  4x10         Standing HS curl    Standing hip 3 way  Leg press   75# 4x10     Leg press   80# 4x10          Mini squat      Step up  1x10  Reviewed HEP HEP      Ther Activity                        Gait Training                        Modalities                            6/6 - HEP was issued and reviewed this date for above noted exercises  Pt demonstrated understanding without incident and without questions/concerns  Will continue to update upcoming

## 2022-09-19 NOTE — PROGRESS NOTES
Janet Maki will be discharged from outpatient physical therapy care due to expiration of plan of care  Last attended tx session was on 8/11 ;did not return to therapy after this date  No objective measures updated, Janet Maki is not present at time of discharge

## 2023-05-17 ENCOUNTER — APPOINTMENT (OUTPATIENT)
Dept: LAB | Facility: MEDICAL CENTER | Age: 43
End: 2023-05-17

## 2023-05-17 DIAGNOSIS — E05.90 PRETIBIAL MYXEDEMA: ICD-10-CM

## 2023-05-17 DIAGNOSIS — E78.5 HYPERLIPIDEMIA, UNSPECIFIED HYPERLIPIDEMIA TYPE: ICD-10-CM

## 2023-05-17 DIAGNOSIS — Z12.31 ENCOUNTER FOR SCREENING MAMMOGRAM FOR MALIGNANT NEOPLASM OF BREAST: ICD-10-CM

## 2023-05-17 LAB
CHOLEST SERPL-MCNC: 171 MG/DL
HDLC SERPL-MCNC: 48 MG/DL
LDLC SERPL CALC-MCNC: 85 MG/DL (ref 0–100)
NONHDLC SERPL-MCNC: 123 MG/DL
T4 FREE SERPL-MCNC: 0.77 NG/DL (ref 0.76–1.46)
TRIGL SERPL-MCNC: 190 MG/DL
TSH SERPL DL<=0.05 MIU/L-ACNC: 3.73 UIU/ML (ref 0.45–4.5)

## 2023-05-20 ENCOUNTER — HOSPITAL ENCOUNTER (OUTPATIENT)
Dept: MAMMOGRAPHY | Facility: HOSPITAL | Age: 43
Discharge: HOME | End: 2023-05-20

## 2023-05-20 VITALS — BODY MASS INDEX: 29.03 KG/M2 | WEIGHT: 196 LBS | HEIGHT: 69 IN

## 2023-05-20 DIAGNOSIS — Z12.31 ENCOUNTER FOR SCREENING MAMMOGRAM FOR MALIGNANT NEOPLASM OF BREAST: ICD-10-CM

## 2023-07-31 DIAGNOSIS — E03.9 HYPOTHYROIDISM, UNSPECIFIED TYPE: Primary | ICD-10-CM

## 2023-07-31 RX ORDER — METHIMAZOLE 5 MG/1
TABLET ORAL
Qty: 60 TABLET | Refills: 5 | Status: SHIPPED | OUTPATIENT
Start: 2023-07-31

## 2023-10-10 DIAGNOSIS — F41.9 ANXIETY: Primary | ICD-10-CM

## 2023-10-11 RX ORDER — ALPRAZOLAM 0.5 MG/1
0.5 TABLET ORAL AS NEEDED
Qty: 60 TABLET | Refills: 1 | Status: SHIPPED | OUTPATIENT
Start: 2023-10-11

## 2023-10-11 NOTE — TELEPHONE ENCOUNTER
Medication Refill Request     Name  ALPRAZolam Sabi Devoid) 0.5 mg tablet  Dose/Frequency Take by mouth as needed for anxiety  Quantity 60 Tablets  Verified pharmacy   [x]  Verified ordering Provider   [x]  Does patient have enough for the next 3 days?  Yes [x] No []

## 2023-12-15 DIAGNOSIS — F41.9 ANXIETY: ICD-10-CM

## 2023-12-15 RX ORDER — ALPRAZOLAM 0.5 MG/1
TABLET ORAL
Qty: 60 TABLET | Refills: 1 | Status: SHIPPED | OUTPATIENT
Start: 2023-12-15

## 2024-01-13 DIAGNOSIS — F41.9 ANXIETY: ICD-10-CM

## 2024-01-13 DIAGNOSIS — E78.2 MIXED HYPERLIPIDEMIA: Primary | ICD-10-CM

## 2024-01-15 RX ORDER — ALPRAZOLAM 0.5 MG/1
TABLET ORAL
Qty: 60 TABLET | Refills: 1 | Status: SHIPPED | OUTPATIENT
Start: 2024-01-15

## 2024-01-15 RX ORDER — SIMVASTATIN 10 MG
TABLET ORAL
Qty: 30 TABLET | Refills: 5 | Status: SHIPPED | OUTPATIENT
Start: 2024-01-15

## 2024-01-18 DIAGNOSIS — F41.9 ANXIETY: Primary | ICD-10-CM

## 2024-01-18 RX ORDER — DULOXETIN HYDROCHLORIDE 60 MG/1
120 CAPSULE, DELAYED RELEASE ORAL DAILY
Qty: 60 CAPSULE | Refills: 1 | Status: SHIPPED | OUTPATIENT
Start: 2024-01-18

## 2024-01-18 RX ORDER — DULOXETIN HYDROCHLORIDE 60 MG/1
CAPSULE, DELAYED RELEASE ORAL
Qty: 60 CAPSULE | Refills: 0 | Status: SHIPPED | OUTPATIENT
Start: 2024-01-18

## 2024-01-31 ENCOUNTER — OFFICE VISIT (OUTPATIENT)
Dept: FAMILY MEDICINE CLINIC | Facility: CLINIC | Age: 44
End: 2024-01-31
Payer: COMMERCIAL

## 2024-01-31 ENCOUNTER — LAB (OUTPATIENT)
Age: 44
End: 2024-01-31
Payer: COMMERCIAL

## 2024-01-31 ENCOUNTER — TELEPHONE (OUTPATIENT)
Dept: ADMINISTRATIVE | Facility: OTHER | Age: 44
End: 2024-01-31

## 2024-01-31 VITALS
WEIGHT: 197 LBS | DIASTOLIC BLOOD PRESSURE: 80 MMHG | TEMPERATURE: 97.5 F | BODY MASS INDEX: 29.18 KG/M2 | OXYGEN SATURATION: 97 % | HEART RATE: 69 BPM | HEIGHT: 69 IN | SYSTOLIC BLOOD PRESSURE: 130 MMHG

## 2024-01-31 DIAGNOSIS — F32.4 MAJOR DEPRESSIVE DISORDER IN PARTIAL REMISSION, UNSPECIFIED WHETHER RECURRENT (HCC): ICD-10-CM

## 2024-01-31 DIAGNOSIS — M16.11 PRIMARY OSTEOARTHRITIS OF RIGHT HIP: ICD-10-CM

## 2024-01-31 DIAGNOSIS — Z00.00 ROUTINE GENERAL MEDICAL EXAMINATION AT A HEALTH CARE FACILITY: Primary | ICD-10-CM

## 2024-01-31 DIAGNOSIS — F41.9 ANXIETY: ICD-10-CM

## 2024-01-31 DIAGNOSIS — E05.90 HYPERTHYROIDISM: ICD-10-CM

## 2024-01-31 DIAGNOSIS — K90.0 CELIAC DISEASE: ICD-10-CM

## 2024-01-31 PROBLEM — M16.9 OSTEOARTHRITIS OF HIP: Status: ACTIVE | Noted: 2022-05-31

## 2024-01-31 PROBLEM — Z96.641 HISTORY OF TOTAL REPLACEMENT OF RIGHT HIP: Status: RESOLVED | Noted: 2022-05-31 | Resolved: 2024-01-31

## 2024-01-31 LAB
ALBUMIN SERPL BCP-MCNC: 4.5 G/DL (ref 3.5–5)
ALP SERPL-CCNC: 54 U/L (ref 34–104)
ALT SERPL W P-5'-P-CCNC: 20 U/L (ref 7–52)
ANION GAP SERPL CALCULATED.3IONS-SCNC: 8 MMOL/L
AST SERPL W P-5'-P-CCNC: 17 U/L (ref 13–39)
BILIRUB SERPL-MCNC: 0.49 MG/DL (ref 0.2–1)
BUN SERPL-MCNC: 12 MG/DL (ref 5–25)
CALCIUM SERPL-MCNC: 9.8 MG/DL (ref 8.4–10.2)
CHLORIDE SERPL-SCNC: 100 MMOL/L (ref 96–108)
CHOLEST SERPL-MCNC: 169 MG/DL
CO2 SERPL-SCNC: 30 MMOL/L (ref 21–32)
CREAT SERPL-MCNC: 0.92 MG/DL (ref 0.6–1.3)
ERYTHROCYTE [DISTWIDTH] IN BLOOD BY AUTOMATED COUNT: 11.5 % (ref 11.6–15.1)
GFR SERPL CREATININE-BSD FRML MDRD: 75 ML/MIN/1.73SQ M
GLUCOSE P FAST SERPL-MCNC: 93 MG/DL (ref 65–99)
HCT VFR BLD AUTO: 43.3 % (ref 34.8–46.1)
HDLC SERPL-MCNC: 44 MG/DL
HGB BLD-MCNC: 14.1 G/DL (ref 11.5–15.4)
LDLC SERPL CALC-MCNC: 93 MG/DL (ref 0–100)
MCH RBC QN AUTO: 32.2 PG (ref 26.8–34.3)
MCHC RBC AUTO-ENTMCNC: 32.6 G/DL (ref 31.4–37.4)
MCV RBC AUTO: 99 FL (ref 82–98)
PLATELET # BLD AUTO: 279 THOUSANDS/UL (ref 149–390)
PMV BLD AUTO: 9.8 FL (ref 8.9–12.7)
POTASSIUM SERPL-SCNC: 4.1 MMOL/L (ref 3.5–5.3)
PROT SERPL-MCNC: 7.3 G/DL (ref 6.4–8.4)
RBC # BLD AUTO: 4.38 MILLION/UL (ref 3.81–5.12)
SODIUM SERPL-SCNC: 138 MMOL/L (ref 135–147)
T3FREE SERPL-MCNC: 3.51 PG/ML (ref 2.5–3.9)
T4 FREE SERPL-MCNC: 0.75 NG/DL (ref 0.61–1.12)
TRIGL SERPL-MCNC: 160 MG/DL
TSH SERPL DL<=0.05 MIU/L-ACNC: 3.73 UIU/ML (ref 0.45–4.5)
WBC # BLD AUTO: 8.02 THOUSAND/UL (ref 4.31–10.16)

## 2024-01-31 PROCEDURE — 80053 COMPREHEN METABOLIC PANEL: CPT

## 2024-01-31 PROCEDURE — 85027 COMPLETE CBC AUTOMATED: CPT

## 2024-01-31 PROCEDURE — 36415 COLL VENOUS BLD VENIPUNCTURE: CPT

## 2024-01-31 PROCEDURE — 3725F SCREEN DEPRESSION PERFORMED: CPT | Performed by: FAMILY MEDICINE

## 2024-01-31 PROCEDURE — 84443 ASSAY THYROID STIM HORMONE: CPT

## 2024-01-31 PROCEDURE — 84481 FREE ASSAY (FT-3): CPT

## 2024-01-31 PROCEDURE — 84439 ASSAY OF FREE THYROXINE: CPT

## 2024-01-31 PROCEDURE — 80061 LIPID PANEL: CPT

## 2024-01-31 PROCEDURE — 99396 PREV VISIT EST AGE 40-64: CPT | Performed by: FAMILY MEDICINE

## 2024-01-31 RX ORDER — ALPRAZOLAM 0.5 MG/1
0.5 TABLET ORAL 2 TIMES DAILY PRN
Qty: 60 TABLET | Refills: 2 | Status: SHIPPED | OUTPATIENT
Start: 2024-01-31

## 2024-01-31 RX ORDER — DULOXETIN HYDROCHLORIDE 60 MG/1
120 CAPSULE, DELAYED RELEASE ORAL DAILY
Qty: 60 CAPSULE | Refills: 1 | Status: SHIPPED | OUTPATIENT
Start: 2024-01-31

## 2024-01-31 NOTE — TELEPHONE ENCOUNTER
----- Message from Sally Lopez MA sent at 1/30/2024  4:20 PM EST -----  Regarding: pap  01/30/24 4:20 PM    Irena, our patient Hannah Mina has had Pap Smear (HPV) aka Cervical Cancer Screening completed/performed. Please assist in updating the patient chart by pulling the Care Everywhere (CE) document. The date of service is 6/5/17.     Thank you,  AARON Owens [facility]

## 2024-01-31 NOTE — PROGRESS NOTES
Name: Hannah Mina      : 1980      MRN: [id number]  Encounter Provider: Sahara Aviles DO  Encounter Date: 2024   Encounter department: [facility]    Assessment & Plan     1. Routine general medical examination at a health care facility  Comments:  Discussed age-appropriate preventative recommendations today.  Routine labs today.  Encouraged to schedule Pap.  Colonoscopy in 2018, Dr. Vasquez.    2. Anxiety  Assessment & Plan:  Xanax as needed.  We are monitoring use.    Orders:  -     DULoxetine (CYMBALTA) 60 mg delayed release capsule; Take 2 capsules (120 mg total) by mouth daily  -     ALPRAZolam (XANAX) 0.5 mg tablet; Take 1 tablet (0.5 mg total) by mouth 2 (two) times a day as needed for anxiety    3. Hyperthyroidism  Assessment & Plan:  Repeating TFTs today.    Orders:  -     Comprehensive metabolic panel; Future  -     CBC; Future  -     TSH, 3rd generation; Future  -     T4, free; Future  -     Lipid Panel with Direct LDL reflex; Future  -     T3, free; Future    4. Major depressive disorder in partial remission, unspecified whether recurrent (HCC)  Assessment & Plan:  With element of chronic pain.  Continue Cymbalta 120 mg daily.      5. Primary osteoarthritis of right hip  Comments:  Resolved.    6. Celiac disease  Assessment & Plan:  Diet controlled.             Subjective      Anxiety/Major depressive disorder-- feeling fairly well on cymbalta, utilizes xanax at bedtime typically, occasionally during the day.     Primary osteoarthritis of right hip-- has leg length discrepancy and starting to have some back pain. Hip pain is much improved after replacement.     Hyperthyroidism-- due for labs, alternates tapazole 1 QOD, 2 QOD.     Preventive-- UTD with mammo, due for pap/pelvic. Declines flu vax.       Review of Systems   Constitutional:  Negative for activity change, appetite change and fever.   HENT:  Negative for trouble swallowing.   "  Respiratory:  Negative for apnea, cough, chest tightness and shortness of breath.    Cardiovascular:  Negative for chest pain, palpitations and leg swelling.   Gastrointestinal:  Negative for abdominal pain.   Musculoskeletal:  Positive for arthralgias and back pain. Negative for gait problem.   Neurological:  Negative for dizziness and light-headedness.       Current Outpatient Medications on File Prior to Visit   Medication Sig   • methimazole (TAPAZOLE) 5 mg tablet Pt takes 1-2 alternating dose.   • simvastatin (ZOCOR) 20 mg tablet Take 20 mg by mouth daily at bedtime   • [DISCONTINUED] ALPRAZolam (XANAX) 0.5 mg tablet Take 1 tablet (0.5 mg total) by mouth twice daily as needed for anxiety   • [DISCONTINUED] DULoxetine (CYMBALTA) 60 mg delayed release capsule Take 2 capsules (120 mg total) by mouth daily   • [DISCONTINUED] simvastatin (ZOCOR) 10 mg tablet TAKE 1 TABLET DAILY IN THE P.M. (Patient not taking: Reported on 1/31/2024)       Objective     /80 (BP Location: Right arm, Patient Position: Sitting, Cuff Size: Large)   Pulse 69   Temp 97.5 °F (36.4 °C) (Tympanic)   Ht 5' 9\" (1.753 m)   Wt 89.4 kg (197 lb)   SpO2 97%   BMI 29.09 kg/m²     Physical Exam  Vitals and nursing note reviewed.   Constitutional:       General: She is not in acute distress.     Appearance: Normal appearance.   HENT:      Head: Normocephalic and atraumatic.   Cardiovascular:      Rate and Rhythm: Normal rate and regular rhythm.      Pulses: Normal pulses.      Heart sounds: No murmur heard.  Pulmonary:      Effort: Pulmonary effort is normal.      Breath sounds: Normal breath sounds. No wheezing, rhonchi or rales.   Musculoskeletal:      Cervical back: Normal range of motion and neck supple. No tenderness.   Lymphadenopathy:      Cervical: No cervical adenopathy.   Neurological:      General: No focal deficit present.      Mental Status: She is alert and oriented to person, place, and time.   Psychiatric:         Mood and " Affect: Mood normal.         Behavior: Behavior normal.         Thought Content: Thought content normal.         Judgment: Judgment normal.       Sahara Aviles, DO

## 2024-01-31 NOTE — TELEPHONE ENCOUNTER
Upon review of the In Basket request we were able to locate, review, and update the patient chart as requested for Pap Smear (HPV) aka Cervical Cancer Screening.    Any additional questions or concerns should be emailed to the Practice Liaisons via the appropriate education email address, please do not reply via In Basket.    Thank you  Sandra Lala MA

## 2024-03-26 ENCOUNTER — TELEPHONE (OUTPATIENT)
Dept: ADMINISTRATIVE | Facility: OTHER | Age: 44
End: 2024-03-26

## 2024-03-26 DIAGNOSIS — F41.9 ANXIETY: ICD-10-CM

## 2024-03-26 DIAGNOSIS — E03.9 HYPOTHYROIDISM, UNSPECIFIED TYPE: ICD-10-CM

## 2024-03-26 RX ORDER — DULOXETIN HYDROCHLORIDE 60 MG/1
120 CAPSULE, DELAYED RELEASE ORAL DAILY
Qty: 60 CAPSULE | Refills: 11 | Status: SHIPPED | OUTPATIENT
Start: 2024-03-26

## 2024-03-26 RX ORDER — METHIMAZOLE 5 MG/1
TABLET ORAL
Qty: 60 TABLET | Refills: 11 | Status: SHIPPED | OUTPATIENT
Start: 2024-03-26

## 2024-03-26 RX ORDER — ALPRAZOLAM 0.5 MG/1
0.5 TABLET ORAL 2 TIMES DAILY PRN
Qty: 60 TABLET | Refills: 2 | Status: SHIPPED | OUTPATIENT
Start: 2024-03-26

## 2024-03-26 NOTE — TELEPHONE ENCOUNTER
----- Message from Sally Lopez MA sent at 3/26/2024 11:24 AM EDT -----  Regarding: colonoscopy  03/26/24 11:24 AM    Irena, our patient Hannah Mina has had CRC: Colonoscopy completed/performed. Please assist in updating the patient chart by making an External outreach to [facility] located in [place]. The date of service is 01/18/2017- original is unreadable we have .    Thank you,  AARON Owens [facility]

## 2024-03-26 NOTE — LETTER
Procedure Request Form: Colonoscopy      Date Requested: 24  Patient: Hannah Mina  Patient : 1980   Referring Provider: Sahara Aviles,         Date of Procedure ______________________________       The above patient has informed us that they have completed their   most recent Colonoscopy at your facility. Please complete   this form and attach all corresponding procedure reports/results.    Comments _Dos -2017, - original is unreadable _________________________________________________________  ____________________________________________________________________  ____________________________________________________________________  ____________________________________________________________________    Facility Completing Procedure _________________________________________    Form Completed By (print name) _______________________________________      Signature __________________________________________________________      These reports are needed for  compliance.    Please fax this completed form and a copy of the procedure report to our office located at [address] [place] [address] as soon as possible to Fax [phone] joby Tobar: Phone [phone]    We thank you for your assistance in treating our mutual patient.

## 2024-03-27 NOTE — TELEPHONE ENCOUNTER
Upon review of the In Basket request and the patient's chart, initial outreach has been made via fax to facility. Please see Contacts section for details.     Thank you  Ekaterina Davidson

## 2024-04-02 NOTE — TELEPHONE ENCOUNTER
As a final attempt, a third outreach has been made via telephone call to facility. Please see Contacts section for details. This encounter will be closed and completed by end of day. Should we receive the requested information because of previous outreach attempts, the requested patient's chart will be updated appropriately.     Thank you  Ekaterina Davidson

## 2024-04-02 NOTE — TELEPHONE ENCOUNTER
Upon review of the In Basket request we were able to locate, review, and update the patient chart as requested for CRC: Colonoscopy.    Any additional questions or concerns should be emailed to the Practice Liaisons via the appropriate education email address, please do not reply via In Basket.    Thank you  Ekaterina Davidson

## 2024-05-15 DIAGNOSIS — Z12.31 ENCOUNTER FOR SCREENING MAMMOGRAM FOR BREAST CANCER: Primary | ICD-10-CM

## 2024-06-08 DIAGNOSIS — E03.9 HYPOTHYROIDISM, UNSPECIFIED TYPE: ICD-10-CM

## 2024-06-10 RX ORDER — METHIMAZOLE 5 MG/1
TABLET ORAL
Qty: 45 TABLET | Refills: 5 | Status: SHIPPED | OUTPATIENT
Start: 2024-06-10

## 2024-07-10 DIAGNOSIS — E78.2 MIXED HYPERLIPIDEMIA: Primary | ICD-10-CM

## 2024-07-10 RX ORDER — SIMVASTATIN 10 MG
TABLET ORAL
Qty: 30 TABLET | Refills: 5 | Status: SHIPPED | OUTPATIENT
Start: 2024-07-10

## 2024-07-30 ENCOUNTER — OFFICE VISIT (OUTPATIENT)
Dept: FAMILY MEDICINE CLINIC | Facility: CLINIC | Age: 44
End: 2024-07-30
Payer: COMMERCIAL

## 2024-07-30 VITALS
HEART RATE: 88 BPM | OXYGEN SATURATION: 98 % | DIASTOLIC BLOOD PRESSURE: 68 MMHG | BODY MASS INDEX: 29.98 KG/M2 | SYSTOLIC BLOOD PRESSURE: 112 MMHG | WEIGHT: 202.4 LBS | HEIGHT: 69 IN | TEMPERATURE: 96.8 F

## 2024-07-30 DIAGNOSIS — F32.4 MAJOR DEPRESSIVE DISORDER IN PARTIAL REMISSION, UNSPECIFIED WHETHER RECURRENT (HCC): ICD-10-CM

## 2024-07-30 DIAGNOSIS — E78.2 MIXED HYPERLIPIDEMIA: ICD-10-CM

## 2024-07-30 DIAGNOSIS — F41.9 ANXIETY: ICD-10-CM

## 2024-07-30 DIAGNOSIS — K90.0 CELIAC DISEASE: Primary | ICD-10-CM

## 2024-07-30 DIAGNOSIS — E05.90 HYPERTHYROIDISM: ICD-10-CM

## 2024-07-30 PROCEDURE — 99214 OFFICE O/P EST MOD 30 MIN: CPT | Performed by: FAMILY MEDICINE

## 2024-07-30 PROCEDURE — 3725F SCREEN DEPRESSION PERFORMED: CPT | Performed by: FAMILY MEDICINE

## 2024-07-30 NOTE — ASSESSMENT & PLAN NOTE
With element of chronic pain.  Does not want to switch her antidepressant at this point.  Will monitor symptoms, recheck 6 months.

## 2024-07-30 NOTE — PROGRESS NOTES
"Ambulatory Visit  Name: Hannah Mina      : 1980      MRN: [id number]  Encounter Provider: Sahara Aviles DO  Encounter Date: 2024   Encounter department: [facility]    Assessment & Plan   1. Celiac disease  Assessment & Plan:  Diet controlled.  2. Hyperthyroidism  Assessment & Plan:  Reviewed TFTs from 6 months ago, stable.  Recheck in 6 months.  Continue Tapazole current dose.  Orders:  -     CBC and differential; Future; Expected date: 2025  -     Comprehensive metabolic panel; Future; Expected date: 2025  -     TSH, 3rd generation; Future; Expected date: 2025  -     T4, free; Future; Expected date: 2025  -     T3, free; Future; Expected date: 2025  3. Major depressive disorder in partial remission, unspecified whether recurrent (HCC)  Assessment & Plan:  With element of chronic pain.  Does not want to switch her antidepressant at this point.  Will monitor symptoms, recheck 6 months.  4. Anxiety  Assessment & Plan:  Xanax as needed, we monitor use.  5. Mixed hyperlipidemia  Assessment & Plan:  Continue statin, recheck lipids in 6 months.  Orders:  -     Lipid Panel with Direct LDL reflex; Future; Expected date: 2025       History of Present Illness     Anxiety/Major depressive disorder-- feeling fairly well on cymbalta, utilizes xanax at bedtime typically, occasionally during the day. She states people do occasionally tell her she \"flies off the handle easily\". Has some ill friends and family members.     Primary osteoarthritis of right hip-- has leg length discrepancy and starting to have some back pain. Hip pain is much improved after replacement. Has shoe insert that helps.     Hyperthyroidism-- alternates tapazole 1 QOD, 2 QOD.     Preventive-- labs done 6 months ago. Mammo due. Due for pap/pelvic.         Review of Systems   Constitutional:  Negative for activity change, appetite change and fever.   HENT:  Negative " "for trouble swallowing.    Respiratory:  Negative for apnea, cough, chest tightness and shortness of breath.    Cardiovascular:  Negative for chest pain, palpitations and leg swelling.   Gastrointestinal:  Negative for abdominal pain.   Musculoskeletal:  Positive for arthralgias and back pain. Negative for gait problem.   Neurological:  Negative for dizziness and light-headedness.       Objective     /68 (BP Location: Left arm, Patient Position: Sitting, Cuff Size: Large)   Pulse 88   Temp (!) 96.8 °F (36 °C) (Tympanic)   Ht 5' 9\" (1.753 m)   Wt 91.8 kg (202 lb 6.4 oz)   SpO2 98%   BMI 29.89 kg/m²     Physical Exam  Vitals and nursing note reviewed.   Constitutional:       General: She is not in acute distress.     Appearance: Normal appearance. She is not ill-appearing.   HENT:      Head: Normocephalic.   Cardiovascular:      Rate and Rhythm: Normal rate and regular rhythm.      Heart sounds: Normal heart sounds. No murmur heard.  Pulmonary:      Effort: Pulmonary effort is normal. No respiratory distress.      Breath sounds: Normal breath sounds. No stridor. No wheezing.   Musculoskeletal:      Cervical back: Normal range of motion and neck supple. No rigidity or tenderness.   Lymphadenopathy:      Cervical: No cervical adenopathy.   Skin:     Capillary Refill: Capillary refill takes less than 2 seconds.   Neurological:      General: No focal deficit present.      Mental Status: She is alert and oriented to person, place, and time. Mental status is at baseline.      Cranial Nerves: No cranial nerve deficit.   Psychiatric:         Mood and Affect: Mood normal.         Thought Content: Thought content normal.         Judgment: Judgment normal.       Administrative Statements     "

## 2024-08-08 DIAGNOSIS — F41.9 ANXIETY: ICD-10-CM

## 2024-08-08 RX ORDER — ALPRAZOLAM 0.5 MG/1
TABLET ORAL
Qty: 60 TABLET | Refills: 0 | Status: SHIPPED | OUTPATIENT
Start: 2024-08-09

## 2024-09-17 ENCOUNTER — HOSPITAL ENCOUNTER (OUTPATIENT)
Dept: MAMMOGRAPHY | Facility: HOSPITAL | Age: 44
Discharge: HOME | End: 2024-09-17
Payer: COMMERCIAL

## 2024-09-17 DIAGNOSIS — Z12.31 ENCOUNTER FOR SCREENING MAMMOGRAM FOR BREAST CANCER: ICD-10-CM

## 2024-09-17 PROCEDURE — 77067 SCR MAMMO BI INCL CAD: CPT

## 2024-09-17 PROCEDURE — 77063 BREAST TOMOSYNTHESIS BI: CPT

## 2024-10-02 DIAGNOSIS — F41.9 ANXIETY: ICD-10-CM

## 2024-10-02 RX ORDER — ALPRAZOLAM 0.5 MG
TABLET ORAL
Qty: 60 TABLET | Refills: 0 | Status: SHIPPED | OUTPATIENT
Start: 2024-10-02

## 2024-10-31 DIAGNOSIS — F41.9 ANXIETY: ICD-10-CM

## 2024-10-31 RX ORDER — ALPRAZOLAM 0.5 MG
TABLET ORAL
Qty: 60 TABLET | Refills: 0 | Status: SHIPPED | OUTPATIENT
Start: 2024-10-31

## 2024-11-04 ENCOUNTER — OFFICE VISIT (OUTPATIENT)
Dept: FAMILY MEDICINE CLINIC | Facility: CLINIC | Age: 44
End: 2024-11-04
Payer: COMMERCIAL

## 2024-11-04 ENCOUNTER — APPOINTMENT (OUTPATIENT)
Dept: RADIOLOGY | Facility: CLINIC | Age: 44
End: 2024-11-04
Payer: COMMERCIAL

## 2024-11-04 VITALS
OXYGEN SATURATION: 98 % | HEIGHT: 69 IN | TEMPERATURE: 98.6 F | DIASTOLIC BLOOD PRESSURE: 80 MMHG | SYSTOLIC BLOOD PRESSURE: 110 MMHG | BODY MASS INDEX: 28.88 KG/M2 | WEIGHT: 195 LBS | HEART RATE: 87 BPM

## 2024-11-04 DIAGNOSIS — M54.50 LUMBOSACRAL PAIN: Primary | ICD-10-CM

## 2024-11-04 DIAGNOSIS — M54.50 LUMBOSACRAL PAIN: ICD-10-CM

## 2024-11-04 PROCEDURE — 96372 THER/PROPH/DIAG INJ SC/IM: CPT | Performed by: FAMILY MEDICINE

## 2024-11-04 PROCEDURE — 99213 OFFICE O/P EST LOW 20 MIN: CPT | Performed by: FAMILY MEDICINE

## 2024-11-04 PROCEDURE — 73522 X-RAY EXAM HIPS BI 3-4 VIEWS: CPT

## 2024-11-04 RX ORDER — KETOROLAC TROMETHAMINE 30 MG/ML
60 INJECTION, SOLUTION INTRAMUSCULAR; INTRAVENOUS ONCE
Status: COMPLETED | OUTPATIENT
Start: 2024-11-04 | End: 2024-11-04

## 2024-11-04 RX ORDER — METHOCARBAMOL 750 MG/1
750 TABLET, FILM COATED ORAL EVERY 6 HOURS PRN
Qty: 20 TABLET | Refills: 1 | Status: SHIPPED | OUTPATIENT
Start: 2024-11-04

## 2024-11-04 RX ORDER — HYDROCODONE BITARTRATE AND ACETAMINOPHEN 5; 325 MG/1; MG/1
1 TABLET ORAL EVERY 6 HOURS PRN
Qty: 5 TABLET | Refills: 0 | Status: SHIPPED | OUTPATIENT
Start: 2024-11-04 | End: 2024-11-06

## 2024-11-04 RX ADMIN — KETOROLAC TROMETHAMINE 60 MG: 30 INJECTION, SOLUTION INTRAMUSCULAR; INTRAVENOUS at 10:18

## 2024-11-04 NOTE — PROGRESS NOTES
"Ambulatory Visit  Name: Hannah Mian      : 1980      MRN: [id number]  Encounter Provider: Sahara Aviles DO  Encounter Date: 2024   Encounter department: [facility]    Assessment & Plan  Lumbosacral pain    Orders:    XR hips bilateral 3-4 vw w pelvis if performed; Future    ketorolac (TORADOL) 60 mg/2 mL IM injection 60 mg    HYDROcodone-acetaminophen (Norco) 5-325 mg per tablet; Take 1 tablet by mouth every 6 (six) hours as needed for pain Max Daily Amount: 4 tablets    methocarbamol (ROBAXIN) 750 mg tablet; Take 1 tablet (750 mg total) by mouth every 6 (six) hours as needed for muscle spasms       History of Present Illness     Pain after fall-   fell 5 days ago, mechanical fall while sweeping leaves. Fell onto left side. Pain has worsened in the past few days. Did not want to go to ER.  Complains of central lower lumbar pain which radiates into her pelvis.  Mild left hip pain.  Denies head trauma or loss of consciousness.  Feels all musculoskeletal.  No shortness of breath.  Used some leftover tramadol she had at home, she reports this was ineffective.          Review of Systems   Constitutional:  Negative for activity change and appetite change.   Respiratory:  Negative for shortness of breath.    Gastrointestinal:  Negative for abdominal pain.   Musculoskeletal:  Positive for arthralgias, back pain, gait problem and myalgias. Negative for joint swelling, neck pain and neck stiffness.   Neurological:  Negative for dizziness, tremors, syncope, weakness, numbness and headaches.           Objective     /80 (BP Location: Left arm, Patient Position: Sitting, Cuff Size: Large)   Pulse 87   Temp 98.6 °F (37 °C) (Tympanic)   Ht 5' 9\" (1.753 m)   Wt 88.5 kg (195 lb)   SpO2 98%   BMI 28.80 kg/m²     Physical Exam  Vitals and nursing note reviewed.   Constitutional:       General: She is not in acute distress.     Appearance: Normal appearance. She is " not ill-appearing.   HENT:      Head: Normocephalic.   Cardiovascular:      Rate and Rhythm: Normal rate and regular rhythm.      Heart sounds: Normal heart sounds. No murmur heard.  Pulmonary:      Effort: Pulmonary effort is normal. No respiratory distress.      Breath sounds: Normal breath sounds. No stridor. No wheezing.   Musculoskeletal:      Left hand: Swelling and tenderness present. Decreased range of motion.      Cervical back: Normal range of motion and neck supple. No rigidity or tenderness.      Thoracic back: Normal.      Lumbar back: Spasms, tenderness and bony tenderness present. No swelling or deformity. Decreased range of motion.   Lymphadenopathy:      Cervical: No cervical adenopathy.   Skin:     Capillary Refill: Capillary refill takes less than 2 seconds.   Neurological:      General: No focal deficit present.      Mental Status: She is alert and oriented to person, place, and time. Mental status is at baseline.      Cranial Nerves: No cranial nerve deficit.   Psychiatric:         Mood and Affect: Mood normal.         Thought Content: Thought content normal.         Judgment: Judgment normal.

## 2024-11-06 DIAGNOSIS — M54.50 LUMBOSACRAL PAIN: Primary | ICD-10-CM

## 2024-11-06 RX ORDER — MELOXICAM 15 MG/1
15 TABLET ORAL DAILY
Qty: 30 TABLET | Refills: 1 | Status: SHIPPED | OUTPATIENT
Start: 2024-11-06

## 2024-11-29 DIAGNOSIS — E03.9 HYPOTHYROIDISM, UNSPECIFIED TYPE: ICD-10-CM

## 2024-11-29 DIAGNOSIS — F41.9 ANXIETY: ICD-10-CM

## 2024-11-29 RX ORDER — METHIMAZOLE 5 MG/1
TABLET ORAL
Qty: 45 TABLET | Refills: 0 | Status: SHIPPED | OUTPATIENT
Start: 2024-11-29

## 2024-11-29 RX ORDER — ALPRAZOLAM 0.5 MG
TABLET ORAL
Qty: 60 TABLET | Refills: 0 | Status: SHIPPED | OUTPATIENT
Start: 2024-11-29 | End: 2024-11-30 | Stop reason: SDUPTHER

## 2024-11-30 DIAGNOSIS — F41.9 ANXIETY: ICD-10-CM

## 2024-12-02 RX ORDER — ALPRAZOLAM 0.5 MG
0.5 TABLET ORAL 2 TIMES DAILY PRN
Qty: 60 TABLET | Refills: 1 | Status: SHIPPED | OUTPATIENT
Start: 2024-12-02

## 2025-01-27 DIAGNOSIS — E78.2 MIXED HYPERLIPIDEMIA: ICD-10-CM

## 2025-01-28 RX ORDER — SIMVASTATIN 10 MG
TABLET ORAL
Qty: 30 TABLET | Refills: 0 | Status: SHIPPED | OUTPATIENT
Start: 2025-01-28 | End: 2025-02-03 | Stop reason: SDUPTHER

## 2025-02-03 ENCOUNTER — OFFICE VISIT (OUTPATIENT)
Dept: FAMILY MEDICINE CLINIC | Facility: CLINIC | Age: 45
End: 2025-02-03
Payer: COMMERCIAL

## 2025-02-03 ENCOUNTER — APPOINTMENT (OUTPATIENT)
Age: 45
End: 2025-02-03
Payer: COMMERCIAL

## 2025-02-03 VITALS
OXYGEN SATURATION: 98 % | HEART RATE: 90 BPM | WEIGHT: 192.2 LBS | TEMPERATURE: 97.8 F | DIASTOLIC BLOOD PRESSURE: 88 MMHG | SYSTOLIC BLOOD PRESSURE: 128 MMHG | HEIGHT: 69 IN | BODY MASS INDEX: 28.47 KG/M2

## 2025-02-03 DIAGNOSIS — E78.2 MIXED HYPERLIPIDEMIA: ICD-10-CM

## 2025-02-03 DIAGNOSIS — M79.602 PARESTHESIA AND PAIN OF BOTH UPPER EXTREMITIES: ICD-10-CM

## 2025-02-03 DIAGNOSIS — Z12.4 SCREENING FOR CERVICAL CANCER: ICD-10-CM

## 2025-02-03 DIAGNOSIS — R05.9 COUGH, UNSPECIFIED TYPE: ICD-10-CM

## 2025-02-03 DIAGNOSIS — R20.2 PARESTHESIA AND PAIN OF BOTH UPPER EXTREMITIES: ICD-10-CM

## 2025-02-03 DIAGNOSIS — Z00.00 ANNUAL PHYSICAL EXAM: Primary | ICD-10-CM

## 2025-02-03 DIAGNOSIS — J10.1 INFLUENZA A: ICD-10-CM

## 2025-02-03 DIAGNOSIS — F32.4 MAJOR DEPRESSIVE DISORDER IN PARTIAL REMISSION, UNSPECIFIED WHETHER RECURRENT (HCC): ICD-10-CM

## 2025-02-03 DIAGNOSIS — M16.11 PRIMARY OSTEOARTHRITIS OF RIGHT HIP: ICD-10-CM

## 2025-02-03 DIAGNOSIS — E05.90 HYPERTHYROIDISM: ICD-10-CM

## 2025-02-03 DIAGNOSIS — F41.9 ANXIETY: ICD-10-CM

## 2025-02-03 DIAGNOSIS — M79.601 PARESTHESIA AND PAIN OF BOTH UPPER EXTREMITIES: ICD-10-CM

## 2025-02-03 DIAGNOSIS — K90.0 CELIAC DISEASE: ICD-10-CM

## 2025-02-03 DIAGNOSIS — N95.1 PERIMENOPAUSE: ICD-10-CM

## 2025-02-03 PROBLEM — E03.9 HYPOTHYROIDISM: Status: ACTIVE | Noted: 2025-02-03

## 2025-02-03 LAB
ALBUMIN SERPL BCG-MCNC: 4.7 G/DL (ref 3.5–5)
ALP SERPL-CCNC: 76 U/L (ref 34–104)
ALT SERPL W P-5'-P-CCNC: 27 U/L (ref 7–52)
ANION GAP SERPL CALCULATED.3IONS-SCNC: 17 MMOL/L (ref 4–13)
AST SERPL W P-5'-P-CCNC: 21 U/L (ref 13–39)
BASOPHILS # BLD AUTO: 0.04 THOUSANDS/ΜL (ref 0–0.1)
BASOPHILS NFR BLD AUTO: 0 % (ref 0–1)
BILIRUB SERPL-MCNC: 0.35 MG/DL (ref 0.2–1)
BUN SERPL-MCNC: 15 MG/DL (ref 5–25)
CALCIUM SERPL-MCNC: 9.9 MG/DL (ref 8.4–10.2)
CHLORIDE SERPL-SCNC: 102 MMOL/L (ref 96–108)
CHOLEST SERPL-MCNC: 176 MG/DL (ref ?–200)
CO2 SERPL-SCNC: 25 MMOL/L (ref 21–32)
CREAT SERPL-MCNC: 0.88 MG/DL (ref 0.6–1.3)
EOSINOPHIL # BLD AUTO: 0.27 THOUSAND/ΜL (ref 0–0.61)
EOSINOPHIL NFR BLD AUTO: 3 % (ref 0–6)
ERYTHROCYTE [DISTWIDTH] IN BLOOD BY AUTOMATED COUNT: 12 % (ref 11.6–15.1)
GFR SERPL CREATININE-BSD FRML MDRD: 79 ML/MIN/1.73SQ M
GLUCOSE P FAST SERPL-MCNC: 132 MG/DL (ref 65–99)
HCT VFR BLD AUTO: 44.7 % (ref 34.8–46.1)
HDLC SERPL-MCNC: 40 MG/DL
HGB BLD-MCNC: 14.7 G/DL (ref 11.5–15.4)
IMM GRANULOCYTES # BLD AUTO: 0.03 THOUSAND/UL (ref 0–0.2)
IMM GRANULOCYTES NFR BLD AUTO: 0 % (ref 0–2)
LDLC SERPL CALC-MCNC: 107 MG/DL (ref 0–100)
LYMPHOCYTES # BLD AUTO: 2.09 THOUSANDS/ΜL (ref 0.6–4.47)
LYMPHOCYTES NFR BLD AUTO: 21 % (ref 14–44)
MCH RBC QN AUTO: 31.7 PG (ref 26.8–34.3)
MCHC RBC AUTO-ENTMCNC: 32.9 G/DL (ref 31.4–37.4)
MCV RBC AUTO: 96 FL (ref 82–98)
MONOCYTES # BLD AUTO: 0.96 THOUSAND/ΜL (ref 0.17–1.22)
MONOCYTES NFR BLD AUTO: 10 % (ref 4–12)
NEUTROPHILS # BLD AUTO: 6.45 THOUSANDS/ΜL (ref 1.85–7.62)
NEUTS SEG NFR BLD AUTO: 66 % (ref 43–75)
NRBC BLD AUTO-RTO: 0 /100 WBCS
PLATELET # BLD AUTO: 334 THOUSANDS/UL (ref 149–390)
PMV BLD AUTO: 9.4 FL (ref 8.9–12.7)
POTASSIUM SERPL-SCNC: 4.6 MMOL/L (ref 3.5–5.3)
PROT SERPL-MCNC: 7.7 G/DL (ref 6.4–8.4)
RBC # BLD AUTO: 4.64 MILLION/UL (ref 3.81–5.12)
SARS-COV-2 AG UPPER RESP QL IA: NEGATIVE
SL AMB POCT RAPID FLU A: POSITIVE
SL AMB POCT RAPID FLU B: NEGATIVE
SODIUM SERPL-SCNC: 144 MMOL/L (ref 135–147)
T3FREE SERPL-MCNC: 2.85 PG/ML (ref 2.5–3.9)
T4 FREE SERPL-MCNC: 0.73 NG/DL (ref 0.61–1.12)
TRIGL SERPL-MCNC: 146 MG/DL (ref ?–150)
TSH SERPL DL<=0.05 MIU/L-ACNC: 2.11 UIU/ML (ref 0.45–4.5)
VALID CONTROL: ABNORMAL
WBC # BLD AUTO: 9.84 THOUSAND/UL (ref 4.31–10.16)

## 2025-02-03 PROCEDURE — 80061 LIPID PANEL: CPT

## 2025-02-03 PROCEDURE — 84443 ASSAY THYROID STIM HORMONE: CPT

## 2025-02-03 PROCEDURE — 36415 COLL VENOUS BLD VENIPUNCTURE: CPT

## 2025-02-03 PROCEDURE — 99213 OFFICE O/P EST LOW 20 MIN: CPT | Performed by: FAMILY MEDICINE

## 2025-02-03 PROCEDURE — 85025 COMPLETE CBC W/AUTO DIFF WBC: CPT

## 2025-02-03 PROCEDURE — 99396 PREV VISIT EST AGE 40-64: CPT | Performed by: FAMILY MEDICINE

## 2025-02-03 PROCEDURE — 87804 INFLUENZA ASSAY W/OPTIC: CPT | Performed by: FAMILY MEDICINE

## 2025-02-03 PROCEDURE — 87811 SARS-COV-2 COVID19 W/OPTIC: CPT | Performed by: FAMILY MEDICINE

## 2025-02-03 PROCEDURE — 84439 ASSAY OF FREE THYROXINE: CPT

## 2025-02-03 PROCEDURE — 80053 COMPREHEN METABOLIC PANEL: CPT

## 2025-02-03 PROCEDURE — 84481 FREE ASSAY (FT-3): CPT

## 2025-02-03 RX ORDER — OSELTAMIVIR PHOSPHATE 75 MG/1
75 CAPSULE ORAL 2 TIMES DAILY
Qty: 10 CAPSULE | Refills: 0 | Status: SHIPPED | OUTPATIENT
Start: 2025-02-03 | End: 2025-02-08

## 2025-02-03 RX ORDER — ALPRAZOLAM 0.5 MG
0.5 TABLET ORAL 2 TIMES DAILY PRN
Qty: 60 TABLET | Refills: 2 | Status: SHIPPED | OUTPATIENT
Start: 2025-02-03

## 2025-02-03 RX ORDER — METHIMAZOLE 5 MG/1
TABLET ORAL
Qty: 45 TABLET | Refills: 3 | Status: SHIPPED | OUTPATIENT
Start: 2025-02-03

## 2025-02-03 RX ORDER — SIMVASTATIN 10 MG
10 TABLET ORAL
Qty: 30 TABLET | Refills: 0 | Status: SHIPPED | OUTPATIENT
Start: 2025-02-03 | End: 2025-05-04

## 2025-02-03 NOTE — ASSESSMENT & PLAN NOTE
Orders:  •  ALPRAZolam (XANAX) 0.5 mg tablet; Take 1 tablet (0.5 mg total) by mouth 2 (two) times a day as needed for anxiety

## 2025-02-03 NOTE — ASSESSMENT & PLAN NOTE
Due for labs.   Orders:  •  methimazole (TAPAZOLE) 5 mg tablet; TAKE 2 TABLETS ALTERNATING WITH 1 TABLET DAILY AS DIRECTED...

## 2025-02-03 NOTE — ASSESSMENT & PLAN NOTE
Orders:  •  simvastatin (ZOCOR) 10 mg tablet; Take 1 tablet (10 mg total) by mouth daily at bedtime

## 2025-02-03 NOTE — PROGRESS NOTES
Name: Hannah Mina      : 1980      MRN: [id number]  Encounter Provider: Sahara Aviles DO  Encounter Date: 2/3/2025   Encounter department: [facility]  :  Assessment & Plan  Annual physical exam  Discussed age appropriate preventative recommendations.          Screening for cervical cancer    Orders:  •  Ambulatory Referral to Obstetrics / Gynecology; Future    Cough, unspecified type    Orders:  •  POCT Rapid Covid Ag  •  POCT rapid flu A and B    Celiac disease  Diet controlled.        Hyperthyroidism  Due for labs.   Orders:  •  methimazole (TAPAZOLE) 5 mg tablet; TAKE 2 TABLETS ALTERNATING WITH 1 TABLET DAILY AS DIRECTED...    Primary osteoarthritis of right hip  S/p replacement.        Major depressive disorder in partial remission, unspecified whether recurrent (HCC)  Fair control. Continue cymbalta.        Anxiety    Orders:  •  ALPRAZolam (XANAX) 0.5 mg tablet; Take 1 tablet (0.5 mg total) by mouth 2 (two) times a day as needed for anxiety    Mixed hyperlipidemia    Orders:  •  simvastatin (ZOCOR) 10 mg tablet; Take 1 tablet (10 mg total) by mouth daily at bedtime    Influenza A    Orders:  •  oseltamivir (TAMIFLU) 75 mg capsule; Take 1 capsule (75 mg total) by mouth 2 (two) times a day for 5 days    Paresthesia and pain of both upper extremities    Orders:  •  XR spine cervical complete 4 or 5 vw non injury; Future    Perimenopause  Will discuss management with OBGYN.              History of Present Illness   Anxiety/Major depressive disorder-- feeling fairly well on cymbalta, utilizes xanax at bedtime typically, occasionally during the day. A little worse lately due to home stressors.     Primary osteoarthritis of right hip-- Hip pain is much improved after replacement.     Paresthesias-- numbness and tingling in hand and toes. Worse in hands and dropping items. Thinks related to C spine disease. Does not want w/u at this time, will wait until better  "time to electively work up.     Hyperthyroidism-- due for labs, alternates tapazole 1 QOD, 2 QOD.     Hyperlipidemia-- on statin, due for labs.     Preventive--up-to-date with mammogram.  Colonoscopy due again 2028. Did not get flu shot. Perimenopausal, hot flashes and has not had menstrual cycle since Oct.     Flu-- started with flu symptoms yesterday.       Review of Systems   Constitutional:  Positive for fatigue. Negative for activity change, appetite change and fever.   HENT:  Positive for congestion, rhinorrhea and sinus pain. Negative for trouble swallowing.    Respiratory:  Negative for apnea, cough, chest tightness and shortness of breath.    Cardiovascular:  Negative for chest pain, palpitations and leg swelling.   Gastrointestinal:  Negative for abdominal pain.   Musculoskeletal:  Positive for arthralgias and back pain. Negative for gait problem.   Neurological:  Negative for dizziness and light-headedness.       Objective   /88 (BP Location: Left arm, Patient Position: Sitting, Cuff Size: Large)   Pulse 90   Temp 97.8 °F (36.6 °C) (Tympanic)   Ht 5' 9\" (1.753 m)   Wt 87.2 kg (192 lb 3.2 oz)   SpO2 98%   BMI 28.38 kg/m²      Physical Exam  Vitals and nursing note reviewed.   Constitutional:       General: She is not in acute distress.     Appearance: Normal appearance. She is normal weight. She is not ill-appearing or toxic-appearing.   HENT:      Head: Normocephalic.      Right Ear: Tympanic membrane, ear canal and external ear normal.      Left Ear: Tympanic membrane, ear canal and external ear normal.      Nose: Nose normal.      Mouth/Throat:      Mouth: Mucous membranes are moist.      Pharynx: Oropharynx is clear.   Eyes:      Pupils: Pupils are equal, round, and reactive to light.   Cardiovascular:      Rate and Rhythm: Normal rate and regular rhythm.      Pulses: Normal pulses.      Heart sounds: No murmur heard.  Pulmonary:      Effort: Pulmonary effort is normal. No respiratory " distress.      Breath sounds: Normal breath sounds.   Musculoskeletal:      Cervical back: Normal range of motion and neck supple. No rigidity or tenderness.   Lymphadenopathy:      Cervical: No cervical adenopathy.   Skin:     General: Skin is warm.      Capillary Refill: Capillary refill takes less than 2 seconds.   Neurological:      General: No focal deficit present.      Mental Status: She is alert. Mental status is at baseline.   Psychiatric:         Mood and Affect: Mood normal.         Thought Content: Thought content normal.         Judgment: Judgment normal.

## 2025-02-04 ENCOUNTER — TELEPHONE (OUTPATIENT)
Age: 45
End: 2025-02-04

## 2025-02-04 ENCOUNTER — RESULTS FOLLOW-UP (OUTPATIENT)
Dept: FAMILY MEDICINE CLINIC | Facility: CLINIC | Age: 45
End: 2025-02-04

## 2025-02-04 NOTE — TELEPHONE ENCOUNTER
CALLED PT TO LET HER KNOW THAT SHE CAN RETURN TO WORKON 2/10/25 BUT SHE SAID SHE COULD NOT BE OFF THAT LONG.  SHE WOULD LIKE TO RETURN ON WED 2/5/25.  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  SHE HAS NO FEVER OR BODYACHES, JUST  HEAD AND NASAL CONGESTION  AND A SLIGHT HEADACHE.  IS IT OK TO WRITE THIS NOTE FOR HER RETURN ON 2/5/25

## 2025-02-04 NOTE — TELEPHONE ENCOUNTER
Patient was seen in office 1/3/25 and diagnosed with Flu A.    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  Please base return to work date off PCP's recommendation as patient is unsure how long she would be considered contagious.    Please call patient back when letter is available.

## 2025-02-04 NOTE — TELEPHONE ENCOUNTER
Called pt to let her know it was ok to return to work.   Did letter for her and will email when she calls back to confirm email address

## 2025-02-04 NOTE — RESULT ENCOUNTER NOTE
Please notify patient her labs are stable including her thyroid function tests.  Her blood sugar was slightly elevated, please inquire if she was fasting at the time of lab draw.

## 2025-02-05 DIAGNOSIS — R73.09 ABNORMAL GLUCOSE: Primary | ICD-10-CM

## 2025-02-26 ENCOUNTER — NURSE TRIAGE (OUTPATIENT)
Age: 45
End: 2025-02-26

## 2025-02-26 DIAGNOSIS — R11.2 NAUSEA AND VOMITING, UNSPECIFIED VOMITING TYPE: Primary | ICD-10-CM

## 2025-02-26 RX ORDER — ONDANSETRON 8 MG/1
8 TABLET, ORALLY DISINTEGRATING ORAL EVERY 8 HOURS PRN
Qty: 20 TABLET | Refills: 0 | Status: SHIPPED | OUTPATIENT
Start: 2025-02-26

## 2025-02-26 NOTE — TELEPHONE ENCOUNTER
"Pt called in c/o vomiting since yesterday. Pt states she vomited 5-times in 24-hrs and 1-time today. Pt is pushing fluids and eating small but not much.  Pt denies blood in vomit and Bms are normal at this time. No fever or any other s/s or complaints at this time.    Per protocol, Triage nurse offered to make pt an appt. Pt refused, states\" she can't drive right now and just wanted to know if PCP suggest anything to do at home for this possible GI bug that's going around.   Pt also needs a [employer] Note for her work. Pt will need the follow dates: Feb-25th -3rd. excused, please.      Reason for Disposition   MILD to MODERATE vomiting (e.g., 1-5 times/day) and lasts > 48 hours (2 days)    Answer Assessment - Initial Assessment Questions  1. VOMITING SEVERITY: \"How many times have you vomited in the past 24 hours?\"       5times in 24hrs  2. ONSET: \"When did the vomiting begin?\"       Yesterday   3. FLUIDS: \"What fluids or food have you vomited up today?\" \"Have you been able to keep any fluids down?\"      Trying keeping fluids down, urinating well  4. ABDOMEN PAIN: \"Are your having any abdomen pain?\" If Yes : \"How bad is it and what does it feel like?\" (e.g., crampy, dull, intermittent, constant)       Crampy comes and goes  5. DIARRHEA: \"Is there any diarrhea?\" If Yes, ask: \"How many times today?\"       denies  6. CONTACTS: \"Is there anyone else in the family with the same symptoms?\"       Yes her mother  7. CAUSE: \"What do you think is causing your vomiting?\"      GI bug  8. HYDRATION STATUS: \"Any signs of dehydration?\" (e.g., dry mouth [not only dry lips], too weak to stand) \"When did you last urinate?\"      Urinated just now, weak to stand,   9. OTHER SYMPTOMS: \"Do you have any other symptoms?\" (e.g., fever, headache, vertigo, vomiting blood or coffee grounds, recent head injury)      Denies, just has a slight headache   10. PREGNANCY: \"Is there any chance you are pregnant?\" \"When was your last menstrual period?\"      "   Menstrual cycle comes and goes    Protocols used: Vomiting-Adult-[place]

## 2025-04-16 DIAGNOSIS — F41.9 ANXIETY: ICD-10-CM

## 2025-04-17 RX ORDER — DULOXETIN HYDROCHLORIDE 60 MG/1
60 CAPSULE, DELAYED RELEASE ORAL DAILY
Qty: 30 CAPSULE | Refills: 5 | Status: SHIPPED | OUTPATIENT
Start: 2025-04-17

## 2025-04-28 DIAGNOSIS — F41.9 ANXIETY: ICD-10-CM

## 2025-04-28 DIAGNOSIS — R11.2 NAUSEA AND VOMITING, UNSPECIFIED VOMITING TYPE: ICD-10-CM

## 2025-04-28 DIAGNOSIS — E78.2 MIXED HYPERLIPIDEMIA: ICD-10-CM

## 2025-04-28 RX ORDER — SIMVASTATIN 10 MG
10 TABLET ORAL
Qty: 30 TABLET | Refills: 5 | Status: SHIPPED | OUTPATIENT
Start: 2025-04-28 | End: 2025-07-27

## 2025-04-28 RX ORDER — ALPRAZOLAM 0.5 MG
0.5 TABLET ORAL 2 TIMES DAILY PRN
Qty: 60 TABLET | Refills: 0 | OUTPATIENT
Start: 2025-04-28

## 2025-04-28 RX ORDER — ONDANSETRON 8 MG/1
8 TABLET, ORALLY DISINTEGRATING ORAL EVERY 8 HOURS PRN
Qty: 20 TABLET | Refills: 0 | Status: SHIPPED | OUTPATIENT
Start: 2025-04-28

## 2025-04-28 RX ORDER — DULOXETIN HYDROCHLORIDE 60 MG/1
60 CAPSULE, DELAYED RELEASE ORAL DAILY
Qty: 30 CAPSULE | Refills: 5 | Status: SHIPPED | OUTPATIENT
Start: 2025-04-28

## 2025-04-29 NOTE — PROGRESS NOTES
Name: Hannah Mina      : 1980      MRN: [id number]  Encounter Provider: Cheri Beasley CNM  Encounter Date: 2025   Encounter department: [facility] [place]  :  Assessment & Plan  Menopausal symptoms  Will get labs prior to starting  Reviewed importance of consistency of medication  Reviewed risk/benefit, side effects.  Routine screening  Importance of decreasing caffeine intake, continuing routine exercise  Will schedule annual exam for 2 months and med follow-up  Orders:    Follicle stimulating hormone; Future    Luteinizing hormone; Future    Estradiol; Future    estradiol (Estrace) 0.5 MG tablet; Take 1 tablet (0.5 mg total) by mouth daily    Progesterone 100 MG CAPS; Take 1 capsule by mouth daily    Screening for cervical cancer    Orders:    Ambulatory Referral to Obstetrics / Gynecology    Hormone replacement therapy (HRT)    Orders:    estradiol (Estrace) 0.5 MG tablet; Take 1 tablet (0.5 mg total) by mouth daily    Progesterone 100 MG CAPS; Take 1 capsule by mouth daily        History of Present Illness   Hannah presents with complaints of hot flashes, sweats. 2024 LMP. Prior to that was skipping a few months at a time. Menses is irregular skipping months at a time. Using no birth control method. With female partner. Last pap smear - greater than 5 yrs. Hx of abnormal pap smear-no. Sexually active- yes- with female partner. 2024 mammogram- normal. She is getting insomnia and notes anxiety since cycle changes.  Active and tries to walk a few days per week. 40 ounces of caffeine per day. Breast changes: none. Notes that she has taken black cohosh- with minimal improve. Notes that she has extreme sweating.  Hannah states that her mother went through menopause in early to mid 40s.  We discussed medications to control vasomotor symptoms -over-the-counter, prescriptive estrogen and progesterone, Brisdelle, Effexor.currently on Cymbalta and well-controlled.  Discussed things that increase hot flashes that increase hot flashes, such as caffeine.  Reviewed HRT risk/benefit, side effect-verbalizes understanding.    Hannah Mina is a 44 y.o. female who presents for menopausal symptoms  History obtained from: patient    Review of Systems   Constitutional:  Positive for diaphoresis. Negative for chills, fatigue and fever.   Respiratory:  Negative for cough and shortness of breath.    Cardiovascular:  Negative for chest pain and palpitations.   Genitourinary:  Positive for menstrual problem. Negative for difficulty urinating, dysuria, pelvic pain, urgency, vaginal discharge and vaginal pain.   Psychiatric/Behavioral:  Positive for agitation and sleep disturbance. Negative for self-injury. The patient is nervous/anxious.      Medical History Reviewed by provider this encounter:     .  Current Outpatient Medications on File Prior to Visit   Medication Sig Dispense Refill    ALPRAZolam (XANAX) 0.5 mg tablet Take 1 tablet (0.5 mg total) by mouth 2 (two) times a day as needed for anxiety (Patient taking differently: Take 0.5 mg by mouth 2 (two) times a day) 60 tablet 2    DULoxetine (CYMBALTA) 60 mg delayed release capsule Take 1 capsule (60 mg total) by mouth daily 30 capsule 5    methimazole (TAPAZOLE) 5 mg tablet TAKE 2 TABLETS ALTERNATING WITH 1 TABLET DAILY AS DIRECTED... 45 tablet 3    ondansetron (ZOFRAN-ODT) 8 mg disintegrating tablet Take 1 tablet (8 mg total) by mouth every 8 (eight) hours as needed for vomiting or nausea 20 tablet 0    meloxicam (Mobic) 15 mg tablet Take 1 tablet (15 mg total) by mouth daily (Patient not taking: Reported on 4/30/2025) 30 tablet 1    simvastatin (ZOCOR) 10 mg tablet Take 1 tablet (10 mg total) by mouth daily at bedtime 30 tablet 5     No current facility-administered medications on file prior to visit.      Social History     Tobacco Use    Smoking status: Former     Current packs/day: 0.00     Average packs/day: 1 pack/day for  "25.0 years (25.0 ttl pk-yrs)     Types: Cigarettes     Quit date: 2/28/2022     Years since quitting: 3.1     Passive exposure: Never    Smokeless tobacco: Never    Tobacco comments:     I am a former smoker   Vaping Use    Vaping status: Never Used   Substance and Sexual Activity    Alcohol use: No    Drug use: No    Sexual activity: Yes     Partners: Female     Birth control/protection: None     Comment:         Objective   /66   Ht 5' 9\" (1.753 m)   Wt 89.8 kg (198 lb)   LMP 10/15/2024   BMI 29.24 kg/m²      Physical Exam  Vitals reviewed.   Constitutional:       Appearance: Normal appearance.   Neck:      Thyroid: No thyroid mass or thyroid tenderness.   Cardiovascular:      Rate and Rhythm: Normal rate.   Pulmonary:      Effort: Pulmonary effort is normal.   Musculoskeletal:         General: Normal range of motion.   Skin:     General: Skin is warm and dry.      Capillary Refill: Capillary refill takes less than 2 seconds.   Neurological:      Mental Status: She is alert and oriented to person, place, and time.   Psychiatric:         Mood and Affect: Mood normal.         Behavior: Behavior normal.           "

## 2025-04-30 ENCOUNTER — APPOINTMENT (OUTPATIENT)
Dept: LAB | Facility: CLINIC | Age: 45
End: 2025-04-30
Payer: COMMERCIAL

## 2025-04-30 ENCOUNTER — OFFICE VISIT (OUTPATIENT)
Dept: OBGYN CLINIC | Facility: CLINIC | Age: 45
End: 2025-04-30
Payer: COMMERCIAL

## 2025-04-30 ENCOUNTER — APPOINTMENT (OUTPATIENT)
Dept: LAB | Facility: CLINIC | Age: 45
End: 2025-04-30
Attending: FAMILY MEDICINE
Payer: COMMERCIAL

## 2025-04-30 ENCOUNTER — RESULTS FOLLOW-UP (OUTPATIENT)
Dept: FAMILY MEDICINE CLINIC | Facility: CLINIC | Age: 45
End: 2025-04-30

## 2025-04-30 VITALS
WEIGHT: 198 LBS | SYSTOLIC BLOOD PRESSURE: 118 MMHG | HEIGHT: 69 IN | BODY MASS INDEX: 29.33 KG/M2 | DIASTOLIC BLOOD PRESSURE: 66 MMHG

## 2025-04-30 DIAGNOSIS — N95.1 MENOPAUSAL SYMPTOMS: Primary | ICD-10-CM

## 2025-04-30 DIAGNOSIS — Z79.890 HORMONE REPLACEMENT THERAPY (HRT): ICD-10-CM

## 2025-04-30 DIAGNOSIS — Z12.4 SCREENING FOR CERVICAL CANCER: ICD-10-CM

## 2025-04-30 DIAGNOSIS — N95.1 MENOPAUSAL SYMPTOMS: ICD-10-CM

## 2025-04-30 LAB
EST. AVERAGE GLUCOSE BLD GHB EST-MCNC: 128 MG/DL
ESTRADIOL SERPL-MCNC: 38.4 PG/ML
FSH SERPL-ACNC: 50.7 MIU/ML
HBA1C MFR BLD: 6.1 %
LH SERPL-ACNC: 28.6 MIU/ML

## 2025-04-30 PROCEDURE — 36415 COLL VENOUS BLD VENIPUNCTURE: CPT

## 2025-04-30 PROCEDURE — 82670 ASSAY OF TOTAL ESTRADIOL: CPT

## 2025-04-30 PROCEDURE — 99203 OFFICE O/P NEW LOW 30 MIN: CPT | Performed by: ADVANCED PRACTICE MIDWIFE

## 2025-04-30 PROCEDURE — 83001 ASSAY OF GONADOTROPIN (FSH): CPT

## 2025-04-30 PROCEDURE — 83002 ASSAY OF GONADOTROPIN (LH): CPT

## 2025-04-30 RX ORDER — PROGESTERONE 100 MG/1
1 CAPSULE ORAL DAILY
Qty: 30 CAPSULE | Refills: 3 | Status: SHIPPED | OUTPATIENT
Start: 2025-04-30

## 2025-04-30 RX ORDER — ESTRADIOL 0.5 MG/1
0.5 TABLET ORAL DAILY
Qty: 30 TABLET | Refills: 1 | Status: SHIPPED | OUTPATIENT
Start: 2025-04-30

## 2025-05-01 ENCOUNTER — RESULTS FOLLOW-UP (OUTPATIENT)
Dept: OBGYN CLINIC | Facility: MEDICAL CENTER | Age: 45
End: 2025-05-01

## 2025-05-01 DIAGNOSIS — R73.09 ABNORMAL GLUCOSE: Primary | ICD-10-CM

## 2025-05-01 NOTE — RESULT ENCOUNTER NOTE
Please let Hannah know she is prediabetic based on her A1c results.  We need to keep a close eye on this, repeat the same lab in 3 months.

## 2025-05-14 DIAGNOSIS — F41.9 ANXIETY: ICD-10-CM

## 2025-05-15 RX ORDER — ALPRAZOLAM 0.5 MG
0.5 TABLET ORAL 2 TIMES DAILY PRN
Qty: 60 TABLET | Refills: 0 | Status: SHIPPED | OUTPATIENT
Start: 2025-05-15

## 2025-05-30 DIAGNOSIS — F41.9 ANXIETY: ICD-10-CM

## 2025-06-02 DIAGNOSIS — F41.9 ANXIETY: ICD-10-CM

## 2025-06-02 RX ORDER — DULOXETIN HYDROCHLORIDE 60 MG/1
60 CAPSULE, DELAYED RELEASE ORAL DAILY
Qty: 60 CAPSULE | Refills: 5 | Status: SHIPPED | OUTPATIENT
Start: 2025-06-02 | End: 2025-06-03 | Stop reason: SDUPTHER

## 2025-06-03 DIAGNOSIS — F41.9 ANXIETY: ICD-10-CM

## 2025-06-03 RX ORDER — DULOXETIN HYDROCHLORIDE 60 MG/1
60 CAPSULE, DELAYED RELEASE ORAL 2 TIMES DAILY
Qty: 60 CAPSULE | Refills: 5 | Status: SHIPPED | OUTPATIENT
Start: 2025-06-03

## 2025-06-03 RX ORDER — DULOXETIN HYDROCHLORIDE 60 MG/1
120 CAPSULE, DELAYED RELEASE ORAL DAILY
Qty: 60 CAPSULE | Refills: 0 | OUTPATIENT
Start: 2025-06-03

## 2025-06-10 ENCOUNTER — OFFICE VISIT (OUTPATIENT)
Dept: FAMILY MEDICINE CLINIC | Facility: CLINIC | Age: 45
End: 2025-06-10
Payer: COMMERCIAL

## 2025-06-10 VITALS
SYSTOLIC BLOOD PRESSURE: 108 MMHG | OXYGEN SATURATION: 98 % | DIASTOLIC BLOOD PRESSURE: 72 MMHG | HEART RATE: 103 BPM | BODY MASS INDEX: 29.47 KG/M2 | HEIGHT: 69 IN | WEIGHT: 199 LBS

## 2025-06-10 DIAGNOSIS — N95.1 PERIMENOPAUSE: ICD-10-CM

## 2025-06-10 DIAGNOSIS — F32.4 MAJOR DEPRESSIVE DISORDER IN PARTIAL REMISSION, UNSPECIFIED WHETHER RECURRENT (HCC): Primary | ICD-10-CM

## 2025-06-10 PROCEDURE — 99213 OFFICE O/P EST LOW 20 MIN: CPT | Performed by: FAMILY MEDICINE

## 2025-06-10 RX ORDER — BUPROPION HYDROCHLORIDE 100 MG/1
100 TABLET ORAL DAILY
Qty: 30 TABLET | Refills: 2 | Status: SHIPPED | OUTPATIENT
Start: 2025-06-10

## 2025-06-10 NOTE — ASSESSMENT & PLAN NOTE
Continue Cymbalta at same dose, 120 mg total daily.  Takes Xanax just as needed, always using appropriately, we monitor use.  I added a very low-dose of Wellbutrin, just 100 mg to take in the morning.  She is leaving for vacation in the morning tomorrow, I advised her to start this medication when she gets home in a week.  Reviewed side effects.  Will recheck 4 to 6 weeks after initiation of therapy, consider increasing if needed.    Orders:  •  buPROPion (WELLBUTRIN) 100 mg tablet; Take 1 tablet (100 mg total) by mouth in the morning

## 2025-06-10 NOTE — PROGRESS NOTES
"Name: Hannah Mina      : 1980      MRN: [id number]  Encounter Provider: Sahara Aviles DO  Encounter Date: 6/10/2025   Encounter department: [facility]  :  Assessment & Plan  Major depressive disorder in partial remission, unspecified whether recurrent (HCC)  Continue Cymbalta at same dose, 120 mg total daily.  Takes Xanax just as needed, always using appropriately, we monitor use.  I added a very low-dose of Wellbutrin, just 100 mg to take in the morning.  She is leaving for vacation in the morning tomorrow, I advised her to start this medication when she gets home in a week.  Reviewed side effects.  Will recheck 4 to 6 weeks after initiation of therapy, consider increasing if needed.    Orders:  •  buPROPion (WELLBUTRIN) 100 mg tablet; Take 1 tablet (100 mg total) by mouth in the morning    Perimenopause  Vasomotor symptoms are improving.               History of Present Illness   Feeling unwell. Plus perimenopausal. [place], angry, depressed, edgy. No SI or HI. Cheri Beasley placed her on HRT which is helping  with menopausal symptoms and night sweats but not with mood. Has been on cymbalta years, switching from sertraline to cymbalta caused worsening symptoms initially. She does not want to stop cymbalta.       Review of Systems   Constitutional:  Negative for activity change, appetite change and fatigue.   Psychiatric/Behavioral:  Positive for agitation, decreased concentration, dysphoric mood and sleep disturbance. Negative for suicidal ideas. The patient is nervous/anxious.        Objective   /72 (BP Location: Left arm, Patient Position: Sitting, Cuff Size: Adult)   Pulse 103   Ht 5' 9\" (1.753 m)   Wt 90.3 kg (199 lb)   SpO2 98%   BMI 29.39 kg/m²      Physical Exam  Vitals and nursing note reviewed.   Constitutional:       General: She is not in acute distress.     Appearance: Normal appearance. She is normal weight. She is not ill-appearing or " toxic-appearing.   HENT:      Head: Normocephalic.     Musculoskeletal:         General: Normal range of motion.     Skin:     General: Skin is warm.     Neurological:      General: No focal deficit present.      Mental Status: She is alert and oriented to person, place, and time. Mental status is at baseline.      Cranial Nerves: No cranial nerve deficit.     Psychiatric:         Attention and Perception: Attention and perception normal.         Mood and Affect: Mood normal. Affect is tearful.         Speech: Speech normal.         Behavior: Behavior normal. Behavior is cooperative.         Thought Content: Thought content normal.         Cognition and Memory: Cognition and memory normal.         Judgment: Judgment normal.

## 2025-06-21 DIAGNOSIS — F41.9 ANXIETY: ICD-10-CM

## 2025-06-21 DIAGNOSIS — Z79.890 HORMONE REPLACEMENT THERAPY (HRT): ICD-10-CM

## 2025-06-21 DIAGNOSIS — E05.90 HYPERTHYROIDISM: ICD-10-CM

## 2025-06-21 DIAGNOSIS — N95.1 MENOPAUSAL SYMPTOMS: ICD-10-CM

## 2025-06-23 RX ORDER — METHIMAZOLE 5 MG/1
TABLET ORAL
Qty: 45 TABLET | Refills: 5 | Status: SHIPPED | OUTPATIENT
Start: 2025-06-23

## 2025-06-23 RX ORDER — ALPRAZOLAM 0.5 MG
0.5 TABLET ORAL 2 TIMES DAILY PRN
Qty: 60 TABLET | Refills: 2 | Status: SHIPPED | OUTPATIENT
Start: 2025-06-23

## 2025-06-23 RX ORDER — ESTRADIOL 0.5 MG/1
0.5 TABLET ORAL DAILY
Qty: 30 TABLET | Refills: 2 | Status: SHIPPED | OUTPATIENT
Start: 2025-06-23

## 2025-07-02 DIAGNOSIS — E05.90 HYPERTHYROIDISM: ICD-10-CM

## 2025-07-03 RX ORDER — METHIMAZOLE 5 MG/1
TABLET ORAL
Qty: 45 TABLET | Refills: 0 | Status: SHIPPED | OUTPATIENT
Start: 2025-07-03

## 2025-07-07 ENCOUNTER — OFFICE VISIT (OUTPATIENT)
Dept: OBGYN CLINIC | Facility: CLINIC | Age: 45
End: 2025-07-07
Payer: COMMERCIAL

## 2025-07-07 VITALS
SYSTOLIC BLOOD PRESSURE: 110 MMHG | WEIGHT: 204 LBS | HEIGHT: 69 IN | BODY MASS INDEX: 30.21 KG/M2 | DIASTOLIC BLOOD PRESSURE: 70 MMHG

## 2025-07-07 DIAGNOSIS — Z12.31 ENCOUNTER FOR SCREENING MAMMOGRAM FOR MALIGNANT NEOPLASM OF BREAST: ICD-10-CM

## 2025-07-07 DIAGNOSIS — N95.1 MENOPAUSAL SYMPTOMS: ICD-10-CM

## 2025-07-07 DIAGNOSIS — Z79.890 HORMONE REPLACEMENT THERAPY (HRT): ICD-10-CM

## 2025-07-07 DIAGNOSIS — Z12.4 PAP SMEAR FOR CERVICAL CANCER SCREENING: ICD-10-CM

## 2025-07-07 DIAGNOSIS — Z01.419 ENCOUNTER FOR GYNECOLOGICAL EXAMINATION WITHOUT ABNORMAL FINDING: ICD-10-CM

## 2025-07-07 PROBLEM — F48.9 MENTAL AND BEHAVIORAL PROBLEM IN ADULT: Status: ACTIVE | Noted: 2022-01-13

## 2025-07-07 PROBLEM — F69 MENTAL AND BEHAVIORAL PROBLEM IN ADULT: Status: ACTIVE | Noted: 2022-01-13

## 2025-07-07 PROCEDURE — G0476 HPV COMBO ASSAY CA SCREEN: HCPCS | Performed by: ADVANCED PRACTICE MIDWIFE

## 2025-07-07 PROCEDURE — G0145 SCR C/V CYTO,THINLAYER,RESCR: HCPCS | Performed by: ADVANCED PRACTICE MIDWIFE

## 2025-07-07 PROCEDURE — S0612 ANNUAL GYNECOLOGICAL EXAMINA: HCPCS | Performed by: ADVANCED PRACTICE MIDWIFE

## 2025-07-07 RX ORDER — ESTRADIOL 1 MG/1
1 TABLET ORAL DAILY
Qty: 90 TABLET | Refills: 3 | Status: SHIPPED | OUTPATIENT
Start: 2025-07-07

## 2025-07-07 RX ORDER — PROGESTERONE 100 MG/1
1 CAPSULE ORAL DAILY
Qty: 90 CAPSULE | Refills: 3 | Status: SHIPPED | OUTPATIENT
Start: 2025-07-07

## 2025-07-07 NOTE — PROGRESS NOTES
Name: Hannah Mina      : 1980      MRN: [id number]  Encounter Provider: Cheri Beasley CNM  Encounter Date: 2025   Encounter department: [facility] [place]  :  Assessment & Plan  Encounter for gynecological examination without abnormal finding  - Routine well woman exam completed today.  - Cervical Cancer Screening: Current ASCCP Guidelines reviewed. Last Pap: 2017 . Next Pap Due: today  - HPV Vaccination status: Not immunized  - STI screening offered including HIV: not indicated based on hx or requested at time of visit  - Breast Cancer Screening: Last Mammogram 2024, script in chart  - Colorectal cancer screening was not ordered.  - The following were reviewed in today's visit: breast self exam, mammography screening ordered, menopause, adequate intake of calcium and vitamin D, and exercise  - RTO 1 yr       Pap smear for cervical cancer screening         Encounter for screening mammogram for malignant neoplasm of breast    Orders:    Mammo screening bilateral w 3d and cad; Future    Menopausal symptoms    Orders:    estradiol (ESTRACE) 1 mg tablet; Take 1 tablet (1 mg total) by mouth daily    Progesterone 100 MG CAPS; Take 1 capsule by mouth daily    Hormone replacement therapy (HRT)  Reviewed risk/benefit, side effect, administration  To contact office with any questions or concerns    Orders:    estradiol (ESTRACE) 1 mg tablet; Take 1 tablet (1 mg total) by mouth daily    Progesterone 100 MG CAPS; Take 1 capsule by mouth daily        History of Present Illness   Hannah presents for gyn exam today. 10/20/2024 LMP. Menses is absent since October. Using no birth control method, has a female partner.  Last pap smear normal. Hx of abnormal pap smear-no. Sexually active- minimal, has female partner. With partner 10 yrs.  Does not desire STI testing.  2024 mammogram- normal. Colonoscopy 2018- repeat 10 yrs.  4-6 hrs sleep per day. 2 servings of calcium rich  "food per day. Active daily. 4 servings of caffeine per day. Breast changes: none. Safe at home- yes. Concerns notes improvement of hot flashes and night sweats. Notes improvement of emotions.      Hannah Mina is a 44 y.o. female who presents for annual gyn exam  History obtained from: patient    Review of Systems   Constitutional:  Positive for diaphoresis. Negative for chills, fatigue and fever.   Respiratory:  Negative for cough and shortness of breath.    Cardiovascular:  Negative for chest pain, palpitations and leg swelling.   Gastrointestinal:  Negative for abdominal pain, constipation and diarrhea.   Genitourinary:  Negative for difficulty urinating, dysuria, frequency, pelvic pain, urgency, vaginal bleeding, vaginal discharge and vaginal pain.   Neurological:  Negative for dizziness and headaches.   Psychiatric/Behavioral:  Negative for self-injury. The patient is nervous/anxious.      Medical History Reviewed by provider this encounter:     .  Medications Ordered Prior to Encounter[1]   Social History[2]     Objective   /70   Ht 5' 9\" (1.753 m)   Wt 92.5 kg (204 lb)   LMP 10/20/2024   BMI 30.13 kg/m²      Physical Exam  Vitals reviewed.   Constitutional:       Appearance: Normal appearance.   Neck:      Thyroid: No thyroid mass or thyroid tenderness.     Cardiovascular:      Rate and Rhythm: Normal rate.   Pulmonary:      Effort: Pulmonary effort is normal.   Chest:   Breasts:     Right: No mass, nipple discharge, skin change or tenderness.      Left: No mass, nipple discharge, skin change or tenderness.   Abdominal:      Tenderness: There is no abdominal tenderness. There is no guarding or rebound.   Genitourinary:     General: Normal vulva.      Exam position: Lithotomy position.      Labia:         Right: No rash, tenderness or lesion.         Left: No rash, tenderness or lesion.       Urethra: No urethral pain, urethral swelling or urethral lesion.      Vagina: No vaginal discharge, " erythema, tenderness, bleeding or lesions.      Cervix: No cervical motion tenderness, discharge, friability, lesion or erythema.      Uterus: Normal. Not enlarged and not tender.       Adnexa:         Right: No mass, tenderness or fullness.          Left: No mass, tenderness or fullness.     Lymphadenopathy:      Upper Body:      Right upper body: No axillary adenopathy.      Left upper body: No axillary adenopathy.     Neurological:      Mental Status: She is alert and oriented to person, place, and time.     Psychiatric:         Mood and Affect: Mood normal.         Behavior: Behavior normal.              [1]   Current Outpatient Medications on File Prior to Visit   Medication Sig Dispense Refill    ALPRAZolam (XANAX) 0.5 mg tablet Take 1 tablet (0.5 mg total) by mouth 2 (two) times a day as needed for anxiety 60 tablet 2    buPROPion (WELLBUTRIN) 100 mg tablet Take 1 tablet (100 mg total) by mouth in the morning 30 tablet 2    DULoxetine (CYMBALTA) 60 mg delayed release capsule Take 1 capsule (60 mg total) by mouth 2 (two) times a day 60 capsule 5    methimazole (TAPAZOLE) 5 mg tablet TAKE 2 TABLETS ALTERNATING WITH 1 TABLET DAILY AS DIRECTED... 45 tablet 0    simvastatin (ZOCOR) 10 mg tablet Take 1 tablet (10 mg total) by mouth daily at bedtime 30 tablet 5    [DISCONTINUED] estradiol (Estrace) 0.5 MG tablet Take 1 tablet (0.5 mg total) by mouth daily 30 tablet 2    [DISCONTINUED] Progesterone 100 MG CAPS Take 1 capsule by mouth daily 30 capsule 3     No current facility-administered medications on file prior to visit.   [2]   Social History  Tobacco Use    Smoking status: Former     Current packs/day: 0.00     Average packs/day: 1 pack/day for 25.0 years (25.0 ttl pk-yrs)     Types: Cigarettes     Quit date: 2/28/2022     Years since quitting: 3.3     Passive exposure: Never    Smokeless tobacco: Never    Tobacco comments:     I am a former smoker   Vaping Use    Vaping status: Never Used   Substance and Sexual  Activity    Alcohol use: No    Drug use: No    Sexual activity: Yes     Partners: Female     Birth control/protection: None     Comment:

## 2025-07-08 LAB
HPV HR 12 DNA CVX QL NAA+PROBE: NEGATIVE
HPV16 DNA CVX QL NAA+PROBE: NEGATIVE
HPV18 DNA CVX QL NAA+PROBE: NEGATIVE

## 2025-07-11 LAB
LAB AP GYN PRIMARY INTERPRETATION: NORMAL
Lab: NORMAL

## 2025-07-16 DIAGNOSIS — F32.4 MAJOR DEPRESSIVE DISORDER IN PARTIAL REMISSION, UNSPECIFIED WHETHER RECURRENT (HCC): ICD-10-CM

## 2025-07-18 RX ORDER — BUPROPION HYDROCHLORIDE 100 MG/1
100 TABLET ORAL DAILY
Qty: 30 TABLET | Refills: 0 | Status: SHIPPED | OUTPATIENT
Start: 2025-07-18

## 2025-07-18 NOTE — TELEPHONE ENCOUNTER
Spoke to patient she states it was discussed about increasing the dose at last visit however I told patient Dr. Aviles's note does not reflect that. She states she is okay with continuing the same dose of 100 mg daily has a follow-up with Dr. Aviles in August so will discuss then with her.

## 2025-07-18 NOTE — TELEPHONE ENCOUNTER
"Okay the refill request states \"this should be a higher dose\"can we please call the patient and ask what is the correct dose  "

## 2025-07-31 DIAGNOSIS — E05.90 HYPERTHYROIDISM: ICD-10-CM

## 2025-08-01 RX ORDER — METHIMAZOLE 5 MG/1
TABLET ORAL
Qty: 45 TABLET | Refills: 0 | Status: SHIPPED | OUTPATIENT
Start: 2025-08-01

## 2025-08-04 ENCOUNTER — OFFICE VISIT (OUTPATIENT)
Dept: FAMILY MEDICINE CLINIC | Facility: CLINIC | Age: 45
End: 2025-08-04
Payer: COMMERCIAL

## 2025-08-04 VITALS
HEART RATE: 105 BPM | HEIGHT: 69 IN | SYSTOLIC BLOOD PRESSURE: 112 MMHG | BODY MASS INDEX: 29.47 KG/M2 | WEIGHT: 199 LBS | OXYGEN SATURATION: 98 % | DIASTOLIC BLOOD PRESSURE: 78 MMHG

## 2025-08-04 DIAGNOSIS — F32.2 SEVERE MAJOR DEPRESSIVE DISORDER (HCC): ICD-10-CM

## 2025-08-04 DIAGNOSIS — F41.9 ANXIETY: ICD-10-CM

## 2025-08-04 DIAGNOSIS — E05.90 HYPERTHYROIDISM: Primary | ICD-10-CM

## 2025-08-04 DIAGNOSIS — M16.11 PRIMARY OSTEOARTHRITIS OF RIGHT HIP: ICD-10-CM

## 2025-08-04 DIAGNOSIS — N95.1 PERIMENOPAUSE: ICD-10-CM

## 2025-08-04 DIAGNOSIS — E78.2 MIXED HYPERLIPIDEMIA: ICD-10-CM

## 2025-08-04 DIAGNOSIS — R73.09 ABNORMAL GLUCOSE: ICD-10-CM

## 2025-08-04 DIAGNOSIS — F32.4 MAJOR DEPRESSIVE DISORDER IN PARTIAL REMISSION, UNSPECIFIED WHETHER RECURRENT (HCC): ICD-10-CM

## 2025-08-04 DIAGNOSIS — K90.0 CELIAC DISEASE: ICD-10-CM

## 2025-08-04 PROBLEM — E03.9 HYPOTHYROIDISM: Status: RESOLVED | Noted: 2025-02-03 | Resolved: 2025-08-04

## 2025-08-04 PROCEDURE — 99214 OFFICE O/P EST MOD 30 MIN: CPT | Performed by: FAMILY MEDICINE

## 2025-08-04 RX ORDER — BUPROPION HYDROCHLORIDE 150 MG/1
150 TABLET ORAL EVERY MORNING
Qty: 30 TABLET | Refills: 5 | Status: SHIPPED | OUTPATIENT
Start: 2025-08-04 | End: 2026-01-31

## 2025-08-15 DIAGNOSIS — F41.9 ANXIETY: ICD-10-CM

## 2025-08-15 RX ORDER — DULOXETIN HYDROCHLORIDE 60 MG/1
120 CAPSULE, DELAYED RELEASE ORAL DAILY
Qty: 180 CAPSULE | Refills: 5 | Status: SHIPPED | OUTPATIENT
Start: 2025-08-15